# Patient Record
Sex: MALE | Race: OTHER | HISPANIC OR LATINO
[De-identification: names, ages, dates, MRNs, and addresses within clinical notes are randomized per-mention and may not be internally consistent; named-entity substitution may affect disease eponyms.]

---

## 2017-09-11 ENCOUNTER — TRANSCRIPTION ENCOUNTER (OUTPATIENT)
Age: 57
End: 2017-09-11

## 2018-05-07 ENCOUNTER — APPOINTMENT (OUTPATIENT)
Dept: DERMATOLOGY | Facility: CLINIC | Age: 58
End: 2018-05-07

## 2018-07-27 ENCOUNTER — APPOINTMENT (OUTPATIENT)
Dept: FAMILY MEDICINE | Facility: CLINIC | Age: 58
End: 2018-07-27
Payer: COMMERCIAL

## 2018-07-27 VITALS
SYSTOLIC BLOOD PRESSURE: 120 MMHG | WEIGHT: 168 LBS | HEART RATE: 81 BPM | HEIGHT: 67 IN | BODY MASS INDEX: 26.37 KG/M2 | TEMPERATURE: 98.5 F | OXYGEN SATURATION: 96 % | DIASTOLIC BLOOD PRESSURE: 78 MMHG

## 2018-07-27 DIAGNOSIS — R40.0 SOMNOLENCE: ICD-10-CM

## 2018-07-27 DIAGNOSIS — R53.83 OTHER MALAISE: ICD-10-CM

## 2018-07-27 DIAGNOSIS — Z87.39 PERSONAL HISTORY OF OTHER DISEASES OF THE MUSCULOSKELETAL SYSTEM AND CONNECTIVE TISSUE: ICD-10-CM

## 2018-07-27 DIAGNOSIS — Z91.81 HISTORY OF FALLING: ICD-10-CM

## 2018-07-27 DIAGNOSIS — M25.559 PAIN IN UNSPECIFIED HIP: ICD-10-CM

## 2018-07-27 DIAGNOSIS — Z87.19 PERSONAL HISTORY OF OTHER DISEASES OF THE DIGESTIVE SYSTEM: ICD-10-CM

## 2018-07-27 DIAGNOSIS — M54.16 RADICULOPATHY, LUMBAR REGION: ICD-10-CM

## 2018-07-27 DIAGNOSIS — Z00.00 ENCOUNTER FOR GENERAL ADULT MEDICAL EXAMINATION W/OUT ABNORMAL FINDINGS: ICD-10-CM

## 2018-07-27 DIAGNOSIS — R53.81 OTHER MALAISE: ICD-10-CM

## 2018-07-27 PROCEDURE — 99204 OFFICE O/P NEW MOD 45 MIN: CPT

## 2018-07-27 NOTE — REVIEW OF SYSTEMS
[Hearing Loss] : hearing loss [Hematuria] : hematuria [Frequency] : frequency [Negative] : Heme/Lymph [FreeTextEntry4] : tinnitus

## 2018-07-27 NOTE — ASSESSMENT
[FreeTextEntry1] : patient's blood pressure is slightly elevated, however there is no need for starting pharmacotherapy at this point. I have advised the patient to lose between 10 and 15 pounds and to make some lifestyle changes in order to maintain his blood pressure had a healthy level.\par The patient has been advised to return to the office for a complete physical exam in 6 weeks at which time he will receive a referral for gastroenterology for colonoscopy screening, patient states he had a colonoscopy about 10 years ago he was followed diverticulosis.\par The patient continues to followup with his urologist for BPH issues.

## 2018-07-27 NOTE — HEALTH RISK ASSESSMENT
[No falls in past year] : Patient reported no falls in the past year [0] : 2) Feeling down, depressed, or hopeless: Not at all (0) [] : No [de-identified] : 4 cigs / day x 40 yr intermittently [de-identified] : seldom alcohol drinker [[id number]] : 0

## 2018-07-27 NOTE — PHYSICAL EXAM
[No Acute Distress] : no acute distress [Well Nourished] : well nourished [Well Developed] : well developed [Well-Appearing] : well-appearing [No Respiratory Distress] : no respiratory distress  [Clear to Auscultation] : lungs were clear to auscultation bilaterally [No Accessory Muscle Use] : no accessory muscle use [Normal Rate] : normal rate  [Regular Rhythm] : with a regular rhythm [Normal S1, S2] : normal S1 and S2 [No Murmur] : no murmur heard [No Abdominal Bruit] : a ~M bruit was not heard ~T in the abdomen [No Edema] : there was no peripheral edema [Soft] : abdomen soft [Non Tender] : non-tender [Normal Bowel Sounds] : normal bowel sounds [Normal Gait] : normal gait [Coordination Grossly Intact] : coordination grossly intact

## 2018-07-27 NOTE — HISTORY OF PRESENT ILLNESS
[FreeTextEntry8] : this is a 58-year-old male with no significant past medical history presenting to the office to establish as a new patient in our practice and requested to have blood pressure checked. Patient has been seeing his urologist Dr. Jean Claude Noel secondary to BPH and prostatitis, states that on his last visit today he was told his blood pressure was elevated and that he should come in to see his PMD. The patient denies any chest pain, shortness of breath, dizziness, palpitations, blurred vision or headaches.

## 2018-08-01 ENCOUNTER — APPOINTMENT (OUTPATIENT)
Dept: PULMONOLOGY | Facility: CLINIC | Age: 58
End: 2018-08-01

## 2018-09-13 ENCOUNTER — APPOINTMENT (OUTPATIENT)
Dept: FAMILY MEDICINE | Facility: CLINIC | Age: 58
End: 2018-09-13

## 2018-11-15 ENCOUNTER — APPOINTMENT (OUTPATIENT)
Dept: FAMILY MEDICINE | Facility: CLINIC | Age: 58
End: 2018-11-15
Payer: COMMERCIAL

## 2018-11-15 ENCOUNTER — MED ADMIN CHARGE (OUTPATIENT)
Age: 58
End: 2018-11-15

## 2018-11-15 VITALS
DIASTOLIC BLOOD PRESSURE: 91 MMHG | HEART RATE: 67 BPM | TEMPERATURE: 98.1 F | BODY MASS INDEX: 26.68 KG/M2 | WEIGHT: 170 LBS | HEIGHT: 67 IN | OXYGEN SATURATION: 98 % | SYSTOLIC BLOOD PRESSURE: 136 MMHG

## 2018-11-15 DIAGNOSIS — Z76.89 PERSONS ENCOUNTERING HEALTH SERVICES IN OTHER SPECIFIED CIRCUMSTANCES: ICD-10-CM

## 2018-11-15 DIAGNOSIS — N52.9 MALE ERECTILE DYSFUNCTION, UNSPECIFIED: ICD-10-CM

## 2018-11-15 DIAGNOSIS — H93.11 TINNITUS, RIGHT EAR: ICD-10-CM

## 2018-11-15 PROCEDURE — 90686 IIV4 VACC NO PRSV 0.5 ML IM: CPT

## 2018-11-15 PROCEDURE — G0008: CPT

## 2018-11-15 PROCEDURE — 99213 OFFICE O/P EST LOW 20 MIN: CPT | Mod: 25

## 2018-11-15 RX ORDER — CIPROFLOXACIN HYDROCHLORIDE 500 MG/1
500 TABLET, FILM COATED ORAL
Qty: 6 | Refills: 0 | Status: COMPLETED | COMMUNITY
Start: 2018-07-27

## 2018-11-15 RX ORDER — MELOXICAM 15 MG/1
15 TABLET ORAL
Qty: 30 | Refills: 0 | Status: COMPLETED | COMMUNITY
Start: 2018-09-20

## 2018-11-15 NOTE — COUNSELING
[Weight management counseling provided] : Weight management [Healthy eating counseling provided] : healthy eating [Activity counseling provided] : activity [Smoking cessation counseling provided] : smoking cessation [___ min/wk activity recommended] : [unfilled] min/wk activity recommended [Quit Smoking] : Quit smoking

## 2018-11-15 NOTE — HISTORY OF PRESENT ILLNESS
[FreeTextEntry1] : " I am here for follow up" [de-identified] : Pt presents for blood pressure follow up, last visit July 2018, currently on no medication. Pt offers no acute complains, requests to have Rx for ED medication.

## 2018-11-15 NOTE — PHYSICAL EXAM
[No Acute Distress] : no acute distress [Well Nourished] : well nourished [Well Developed] : well developed [Well-Appearing] : well-appearing [No Respiratory Distress] : no respiratory distress  [Clear to Auscultation] : lungs were clear to auscultation bilaterally [No Accessory Muscle Use] : no accessory muscle use [Normal Rate] : normal rate  [Regular Rhythm] : with a regular rhythm [Normal S1, S2] : normal S1 and S2 [No Murmur] : no murmur heard [No Edema] : there was no peripheral edema

## 2018-11-15 NOTE — PLAN
[FreeTextEntry1] : \par \par Case discussed with and reviewed by supervising attending Dr. Danuta Chan M.D.

## 2018-11-15 NOTE — ASSESSMENT
[FreeTextEntry1] : This is a 58-year-old male with a past medical history of elevated blood pressure without diagnosis of HTN, BPH, direct dysfunction, presenting to the office for blood pressure check and requests prescription refills. \par \par Cardiovascular: History of elevated blood pressure.\par -The patient continues to be slightly elevated, diet and exercise again have been discussed with the patient.\par -There is no need for pharmacological intervention at this time, will continue to try to manage with lifestyle changes.\par \par : History of BPH, ED.\par -Patient being seen by urology Dr. Shanks.\par -Encouraged the patient to continue to take Flomax once a day.\par -The patient will receive a prescription for Cialis 10 mg which has been tolerated well in the past.\par \par Healthcare maintenance:\par -Patient will receive flu vaccine today.\par -Patient will get referral for gastroenterology for colonoscopy screening.\par -Patient has been encouraged to make an appointment for a complete physical exam at his earliest convenience.

## 2018-11-15 NOTE — HEALTH RISK ASSESSMENT
[No falls in past year] : Patient reported no falls in the past year [] : No [de-identified] : 4 cig / day on off x 40 yr [de-identified] : seldom alcohol drinker

## 2019-01-21 ENCOUNTER — APPOINTMENT (OUTPATIENT)
Dept: GASTROENTEROLOGY | Facility: CLINIC | Age: 59
End: 2019-01-21

## 2019-01-23 ENCOUNTER — TRANSCRIPTION ENCOUNTER (OUTPATIENT)
Age: 59
End: 2019-01-23

## 2019-02-15 ENCOUNTER — APPOINTMENT (OUTPATIENT)
Dept: FAMILY MEDICINE | Facility: CLINIC | Age: 59
End: 2019-02-15
Payer: COMMERCIAL

## 2019-02-15 VITALS
HEART RATE: 70 BPM | DIASTOLIC BLOOD PRESSURE: 94 MMHG | OXYGEN SATURATION: 98 % | TEMPERATURE: 98.2 F | BODY MASS INDEX: 26.68 KG/M2 | HEIGHT: 67 IN | WEIGHT: 170 LBS | SYSTOLIC BLOOD PRESSURE: 135 MMHG

## 2019-02-15 PROCEDURE — 99213 OFFICE O/P EST LOW 20 MIN: CPT

## 2019-02-15 NOTE — ASSESSMENT
[FreeTextEntry1] : This is a 58-year-old male with a past medical history of elevated blood pressure without diagnosis of HTN, BPH, direct dysfunction, presenting to the office for chest pain, knee pain.\par \par Cardiovascular: History of elevated blood pressure, episode of chest pain.\par -The patient continues to be slightly elevated, diet and exercise again have been discussed with the patient.\par -There is no need for pharmacological intervention at this time, will continue to try to manage with lifestyle changes.\par -EKG shows no acute changes, CP ? Dehydration / Pleurisy. Continue to monitor since now not smoking.\par \par : History of BPH, ED.\par -Patient being seen by urology Dr. Shanks.\par -Encouraged the patient to continue to take Flomax once a day.\par \par Healthcare maintenance:\par -Patient received flu vaccine last visit November 2018 today.\par -Patient will get referral for gastroenterology for colonoscopy screening again as he missed the appointment.\par -Patient has been encouraged to make an appointment for a complete physical exam at his earliest convenience.\par -Dermatology referral given\par -Xray of right knee.

## 2019-02-15 NOTE — REVIEW OF SYSTEMS
[Chest Pain] : chest pain [Joint Pain] : joint pain [Itching] : itching [Mole Changes] : mole changes [Skin Rash] : skin rash [Negative] : Respiratory [FreeTextEntry5] : See HPI [FreeTextEntry9] : See HPI

## 2019-02-15 NOTE — PHYSICAL EXAM
[No Acute Distress] : no acute distress [Well Nourished] : well nourished [Well Developed] : well developed [Well-Appearing] : well-appearing [No JVD] : no jugular venous distention [Supple] : supple [No Lymphadenopathy] : no lymphadenopathy [Thyroid Normal, No Nodules] : the thyroid was normal and there were no nodules present [No Respiratory Distress] : no respiratory distress  [Clear to Auscultation] : lungs were clear to auscultation bilaterally [No Accessory Muscle Use] : no accessory muscle use [Normal Rate] : normal rate  [Regular Rhythm] : with a regular rhythm [Normal S1, S2] : normal S1 and S2 [No Murmur] : no murmur heard [No Abdominal Bruit] : a ~M bruit was not heard ~T in the abdomen [No Edema] : there was no peripheral edema [Normal Gait] : normal gait [de-identified] : Right knee inspected, neg VALGUS/ VARUS maneuvers, + posterior right Knee soft / movable area, non tender.

## 2019-02-15 NOTE — HEALTH RISK ASSESSMENT
[] : No [No falls in past year] : Patient reported no falls in the past year [de-identified] : quit 8 weeks ago [de-identified] : occasional / social

## 2019-02-15 NOTE — HISTORY OF PRESENT ILLNESS
[FreeTextEntry8] : s/p episode of chest pain 8/10 with dizziness that lasted a few seconds while vacationing in [facility], episode did not repeat itself. Pt states that in the past he had similar episode, had Cardio work up many years ago which was negative. Pt states that since the episode of chest pain he has stopped smoking. Pt also complains of itchy mole in his back, had been sent to see dermatology last year but did not keep appointment. Pt also complains of pain in the RIGHT knee x 3 months, states that he cannot bend the knee completely. Pt has been getting therapy at his local [facility], has had about 10 sessions with only relief while undergoing therapy, then the pain returns. Pt has not been taking anything for pain, pain rated

## 2019-02-19 ENCOUNTER — FORM ENCOUNTER (OUTPATIENT)
Age: 59
End: 2019-02-19

## 2019-02-20 ENCOUNTER — OUTPATIENT (OUTPATIENT)
Dept: OUTPATIENT SERVICES | Facility: HOSPITAL | Age: 59
LOS: 1 days | End: 2019-02-20
Payer: COMMERCIAL

## 2019-02-20 ENCOUNTER — APPOINTMENT (OUTPATIENT)
Dept: RADIOLOGY | Facility: CLINIC | Age: 59
End: 2019-02-20

## 2019-02-20 DIAGNOSIS — M25.561 PAIN IN RIGHT KNEE: ICD-10-CM

## 2019-02-20 PROCEDURE — 73564 X-RAY EXAM KNEE 4 OR MORE: CPT

## 2019-02-20 PROCEDURE — 73564 X-RAY EXAM KNEE 4 OR MORE: CPT | Mod: 26,RT

## 2019-02-22 ENCOUNTER — APPOINTMENT (OUTPATIENT)
Dept: GASTROENTEROLOGY | Facility: CLINIC | Age: 59
End: 2019-02-22

## 2019-02-24 ENCOUNTER — RESULT REVIEW (OUTPATIENT)
Age: 59
End: 2019-02-24

## 2019-02-25 ENCOUNTER — APPOINTMENT (OUTPATIENT)
Dept: DERMATOLOGY | Facility: CLINIC | Age: 59
End: 2019-02-25
Payer: COMMERCIAL

## 2019-02-25 PROCEDURE — 99213 OFFICE O/P EST LOW 20 MIN: CPT | Mod: 25

## 2019-02-25 PROCEDURE — 11102 TANGNTL BX SKIN SINGLE LES: CPT

## 2020-03-02 ENCOUNTER — APPOINTMENT (OUTPATIENT)
Dept: FAMILY MEDICINE | Facility: CLINIC | Age: 60
End: 2020-03-02

## 2020-07-07 ENCOUNTER — TRANSCRIPTION ENCOUNTER (OUTPATIENT)
Age: 60
End: 2020-07-07

## 2020-10-20 ENCOUNTER — TRANSCRIPTION ENCOUNTER (OUTPATIENT)
Age: 60
End: 2020-10-20

## 2020-12-01 ENCOUNTER — APPOINTMENT (OUTPATIENT)
Dept: FAMILY MEDICINE | Facility: CLINIC | Age: 60
End: 2020-12-01
Payer: COMMERCIAL

## 2020-12-01 ENCOUNTER — OUTPATIENT (OUTPATIENT)
Dept: OUTPATIENT SERVICES | Facility: HOSPITAL | Age: 60
LOS: 1 days | End: 2020-12-01
Payer: COMMERCIAL

## 2020-12-01 ENCOUNTER — APPOINTMENT (OUTPATIENT)
Dept: RADIOLOGY | Facility: CLINIC | Age: 60
End: 2020-12-01
Payer: COMMERCIAL

## 2020-12-01 VITALS
RESPIRATION RATE: 14 BRPM | DIASTOLIC BLOOD PRESSURE: 84 MMHG | HEART RATE: 74 BPM | SYSTOLIC BLOOD PRESSURE: 122 MMHG | TEMPERATURE: 98.3 F | OXYGEN SATURATION: 98 % | BODY MASS INDEX: 27.15 KG/M2 | WEIGHT: 173 LBS | HEIGHT: 67 IN

## 2020-12-01 DIAGNOSIS — M25.551 PAIN IN RIGHT HIP: ICD-10-CM

## 2020-12-01 DIAGNOSIS — R07.89 OTHER CHEST PAIN: ICD-10-CM

## 2020-12-01 DIAGNOSIS — M25.561 PAIN IN RIGHT KNEE: ICD-10-CM

## 2020-12-01 DIAGNOSIS — R03.0 ELEVATED BLOOD-PRESSURE READING, W/OUT DIAGNOSIS OF HYPERTENSION: ICD-10-CM

## 2020-12-01 DIAGNOSIS — Z87.438 PERSONAL HISTORY OF OTHER DISEASES OF MALE GENITAL ORGANS: ICD-10-CM

## 2020-12-01 DIAGNOSIS — Z86.018 PERSONAL HISTORY OF OTHER BENIGN NEOPLASM: ICD-10-CM

## 2020-12-01 DIAGNOSIS — G89.29 PAIN IN RIGHT KNEE: ICD-10-CM

## 2020-12-01 PROCEDURE — 96372 THER/PROPH/DIAG INJ SC/IM: CPT

## 2020-12-01 PROCEDURE — 73521 X-RAY EXAM HIPS BI 2 VIEWS: CPT

## 2020-12-01 PROCEDURE — 99072 ADDL SUPL MATRL&STAF TM PHE: CPT

## 2020-12-01 PROCEDURE — 90686 IIV4 VACC NO PRSV 0.5 ML IM: CPT

## 2020-12-01 PROCEDURE — G0008: CPT

## 2020-12-01 PROCEDURE — 99213 OFFICE O/P EST LOW 20 MIN: CPT | Mod: 25

## 2020-12-01 PROCEDURE — 73521 X-RAY EXAM HIPS BI 2 VIEWS: CPT | Mod: 26

## 2020-12-01 RX ORDER — TADALAFIL 10 MG/1
10 TABLET, FILM COATED ORAL
Qty: 5 | Refills: 5 | Status: COMPLETED | COMMUNITY
Start: 2018-11-15 | End: 2020-12-01

## 2020-12-01 RX ORDER — TRIAMCINOLONE ACETONIDE 40 MG/ML
40 SUSPENSION INTRA-ARTERIAL; INTRAMUSCULAR
Qty: 1 | Refills: 0 | Status: COMPLETED | OUTPATIENT
Start: 2020-12-01

## 2020-12-01 RX ORDER — CYANOCOBALAMIN 1000 UG/ML
1000 INJECTION INTRAMUSCULAR; SUBCUTANEOUS
Qty: 0 | Refills: 0 | Status: COMPLETED | OUTPATIENT
Start: 2020-12-01

## 2020-12-01 RX ORDER — TAMSULOSIN HYDROCHLORIDE 0.4 MG/1
0.4 CAPSULE ORAL
Qty: 90 | Refills: 1 | Status: COMPLETED | COMMUNITY
Start: 2018-07-27 | End: 2020-12-01

## 2020-12-01 RX ADMIN — TRIAMCINOLONE ACETONIDE 1 MG/ML: 40 INJECTION, SUSPENSION INTRA-ARTICULAR; INTRAMUSCULAR at 00:00

## 2020-12-01 RX ADMIN — CYANOCOBALAMIN 0 MCG/ML: 1000 INJECTION INTRAMUSCULAR; SUBCUTANEOUS at 00:00

## 2020-12-01 NOTE — ASSESSMENT
[FreeTextEntry1] : This is a 58-year-old male with a past medical history of BPH, Erectyle dysfunction, presenting to the office c/o bilateral hip pain.\par \par MSK/RHEUM: Hip arthritis\par -Naproxen 500 mg bid prn\par -Cyanocobalamin 0.75 ml + Kenalog 40 mg/ml 1 mL injection\par -Bilateral Hip xray.\par \par : History of BPH, ED.\par -Patient has been off Flomax for a long time, no current complains\par \par Healthcare maintenance:\par -Patient received flu vaccine in house today.\par -Patient has been encouraged to make an appointment for a complete physical exam at his earliest convenience.\par -Last Colonoscopy 2013 with Dr Genao, repeat 2023\par

## 2020-12-01 NOTE — HISTORY OF PRESENT ILLNESS
[FreeTextEntry8] : This is a 59 y/o male with PMHx significant for BPH, Chronic knee pain, presenting c/o bilateral hip pain that is worse when going to sleep and early in the morning, improving as the day goes on, pt states that he has taken OTC NSAIDs with some relief. Pt denies any trauma, has family h/o early osteoporosis (mom in 30s).

## 2020-12-01 NOTE — COUNSELING
[AUDIT-C Screening administered and reviewed] : AUDIT-C Screening administered and reviewed [Encouraged to increase physical activity] : Encouraged to increase physical activity [____ min/wk Activity] : [unfilled] min/wk activity [None] : None [Good understanding] : Patient has a good understanding of lifestyle changes and steps needed to achieve self management goal

## 2020-12-01 NOTE — HEALTH RISK ASSESSMENT
[No falls in past year] : Patient reported no falls in the past year [Yes] : Yes [2 - 4 times a month (2 pts)] : 2-4 times a month (2 points) [1 or 2 (0 pts)] : 1 or 2 (0 points) [Never (0 pts)] : Never (0 points) [No] : In the past 12 months have you used drugs other than those required for medical reasons? No [0] : 2) Feeling down, depressed, or hopeless: Not at all (0) [] : No [de-identified] : quit 10 months ago [de-identified] : occasional / social [Audit-CScore] : 2 [de-identified] : none [de-identified] : regular [CFC2Ecmij] : 0

## 2021-04-26 ENCOUNTER — APPOINTMENT (OUTPATIENT)
Dept: FAMILY MEDICINE | Facility: CLINIC | Age: 61
End: 2021-04-26
Payer: COMMERCIAL

## 2021-04-26 VITALS
OXYGEN SATURATION: 98 % | TEMPERATURE: 98.4 F | SYSTOLIC BLOOD PRESSURE: 126 MMHG | RESPIRATION RATE: 16 BRPM | BODY MASS INDEX: 27.78 KG/M2 | DIASTOLIC BLOOD PRESSURE: 80 MMHG | HEART RATE: 61 BPM | WEIGHT: 177 LBS | HEIGHT: 67 IN

## 2021-04-26 DIAGNOSIS — Z72.0 TOBACCO USE: ICD-10-CM

## 2021-04-26 DIAGNOSIS — M25.551 PAIN IN RIGHT HIP: ICD-10-CM

## 2021-04-26 DIAGNOSIS — M25.552 PAIN IN RIGHT HIP: ICD-10-CM

## 2021-04-26 DIAGNOSIS — Z92.29 PERSONAL HISTORY OF OTHER DRUG THERAPY: ICD-10-CM

## 2021-04-26 PROCEDURE — 99072 ADDL SUPL MATRL&STAF TM PHE: CPT

## 2021-04-26 PROCEDURE — 99213 OFFICE O/P EST LOW 20 MIN: CPT

## 2021-04-28 PROBLEM — Z92.29 HISTORY OF INFLUENZA VACCINATION: Status: RESOLVED | Noted: 2018-11-15 | Resolved: 2021-04-28

## 2021-04-28 PROBLEM — M25.551 BILATERAL HIP PAIN: Status: ACTIVE | Noted: 2020-12-01

## 2021-04-28 NOTE — HEALTH RISK ASSESSMENT
[Yes] : Yes [2 - 4 times a month (2 pts)] : 2-4 times a month (2 points) [1 or 2 (0 pts)] : 1 or 2 (0 points) [Never (0 pts)] : Never (0 points) [No] : In the past 12 months have you used drugs other than those required for medical reasons? No [No falls in past year] : Patient reported no falls in the past year [0] : 2) Feeling down, depressed, or hopeless: Not at all (0) [] : No [de-identified] : quit 10 months ago [de-identified] : occasional / social [Audit-CScore] : 2 [de-identified] : none [de-identified] : regular [BQI2Uhknz] : 0

## 2021-04-28 NOTE — PHYSICAL EXAM
[Normal] : normal gait, coordination grossly intact, no focal deficits and deep tendon reflexes were 2+ and symmetric [de-identified] : No signs of trauma on bilateral hips, no erythema. RIGHT knee examined, slight inflammation but no edema, no sign of trauma, ballotable patella, + Valgus test, negative lachman, Negative posterior / anterior drawer test.

## 2021-04-28 NOTE — HISTORY OF PRESENT ILLNESS
[FreeTextEntry8] : This is a 59 y/o male with PMHx significant for BPH, Chronic knee pain, presenting c/o continued bilateral hip pain for which he was seen in Dec 2020, Xrays did not reveal any abnormality, he was given NSAIDs but states that the pain continues. Pt also c/o continued RIGHT knee pain which is chronic, no recent trauma, Xrays performed 2019 showed small effusion.

## 2021-04-28 NOTE — ASSESSMENT
[FreeTextEntry1] : This is a 58-year-old male with a past medical history of BPH, Erectile dysfunction, presenting to the office c/o still bilateral hip pain and chronic RIGHT knee pain.\par \par MSK/RHEUM: Hip arthritis, Knee pain\par -Naproxen 500 mg bid prn, Rx refilled\par -Medrol dose pack\par -right knee MRI (needs P.A.)\par -Bilateral Hip xray performed, unremarkable\par -RIGHT knee Xray 2019, small joint effusion\par -Possible Ortho eval.\par \par : History of BPH, ED.\par -Patient has been off Flomax for a long time, no current complains\par \par PULM: Smoking trying to quit\par -Start Chantx starter pack.\par -Side effects discussed.\par \par Healthcare maintenance:\par -Patient received flu vaccine in house 12/20\par -Patient has been encouraged to make an appointment for a complete physical exam at his earliest convenience.\par -Last Colonoscopy 2013 with Dr Genao, repeat 2023\par -Recommend Covid vaccine\par

## 2021-05-17 ENCOUNTER — APPOINTMENT (OUTPATIENT)
Dept: FAMILY MEDICINE | Facility: CLINIC | Age: 61
End: 2021-05-17
Payer: COMMERCIAL

## 2021-05-17 VITALS
OXYGEN SATURATION: 98 % | HEIGHT: 67 IN | SYSTOLIC BLOOD PRESSURE: 120 MMHG | WEIGHT: 170 LBS | RESPIRATION RATE: 12 BRPM | DIASTOLIC BLOOD PRESSURE: 70 MMHG | BODY MASS INDEX: 26.68 KG/M2 | TEMPERATURE: 97.9 F | HEART RATE: 71 BPM

## 2021-05-17 DIAGNOSIS — Z11.59 ENCOUNTER FOR SCREENING FOR OTHER VIRAL DISEASES: ICD-10-CM

## 2021-05-17 DIAGNOSIS — Z11.4 ENCOUNTER FOR SCREENING FOR HUMAN IMMUNODEFICIENCY VIRUS [HIV]: ICD-10-CM

## 2021-05-17 PROCEDURE — 36415 COLL VENOUS BLD VENIPUNCTURE: CPT

## 2021-05-17 PROCEDURE — 99072 ADDL SUPL MATRL&STAF TM PHE: CPT

## 2021-05-17 PROCEDURE — 99396 PREV VISIT EST AGE 40-64: CPT | Mod: 25

## 2021-05-17 RX ORDER — METHYLPREDNISOLONE 4 MG/1
4 TABLET ORAL
Qty: 21 | Refills: 0 | Status: COMPLETED | COMMUNITY
Start: 2021-04-26

## 2021-05-17 NOTE — HISTORY OF PRESENT ILLNESS
[FreeTextEntry8] : This is a 59 y/o male with PMHx significant for BPH, Chronic knee pain, presenting c/o continued bilateral hip pain for which he was seen in Dec 2020, Xrays did not reveal any abnormality, he was given NSAIDs but states that the pain continues. Pt also c/o continued RIGHT knee pain which is chronic, no recent trauma, Xrays performed 2019 showed small effusion. [FreeTextEntry1] : Physical exam [de-identified] : This is a 59 y/o male with PMHx significant for BPH, Chronic knee pain, presenting c/o continued bilateral hip pain for which he was seen in Dec 2020, X-rays did not reveal any abnormality, he was given NSAIDs but states that the pain continues. Pt also c/o continued RIGHT knee pain which is chronic, no recent trauma, X-rays performed 2019 showed small effusion.

## 2021-05-17 NOTE — ASSESSMENT
[FreeTextEntry1] : This is a 58-year-old male with a past medical history of BPH, Erectile dysfunction, presenting to the office for CPE, still c/o bilateral hip pain and chronic RIGHT knee pain which improved after Medrol pack.\par \par MSK/RHEUM: Hip arthritis, Knee pain\par -Naproxen 500 mg bid prn, Rx refilled\par -right knee MRI (needs P.A.)\par -Bilateral Hip xray performed, unremarkable\par -RIGHT knee Xray 2019, small joint effusion\par -Possible Ortho eval.\par \par : History of BPH, ED.\par -Patient has been off Flomax for a long time, no current complains\par \par Healthcare maintenance:\par -Fasting blood work in house today.\par -Patient received flu vaccine in house 12/20\par -Patient has been encouraged to make an appointment for a complete physical exam at his earliest convenience.\par -Last Colonoscopy 2013 with Dr Genao, repeat 2023\par -PFIZER Covid vaccine dose #1 5/5/21\par

## 2021-05-17 NOTE — HEALTH RISK ASSESSMENT
[Yes] : Yes [2 - 4 times a month (2 pts)] : 2-4 times a month (2 points) [1 or 2 (0 pts)] : 1 or 2 (0 points) [Never (0 pts)] : Never (0 points) [No] : In the past 12 months have you used drugs other than those required for medical reasons? No [No falls in past year] : Patient reported no falls in the past year [0] : 2) Feeling down, depressed, or hopeless: Not at all (0) [Good] : ~his/her~  mood as  good [Patient reported colonoscopy was normal] : Patient reported colonoscopy was normal [HIV Test offered] : HIV Test offered [Hepatitis C test offered] : Hepatitis C test offered [None] : None [With Family] : lives with family [# of Members in Household ___] :  household currently consist of [unfilled] member(s) [Employed] : employed [] :  [# Of Children ___] : has [unfilled] children [Sexually Active] : sexually active [Feels Safe at Home] : Feels safe at home [Fully functional (bathing, dressing, toileting, transferring, walking, feeding)] : Fully functional (bathing, dressing, toileting, transferring, walking, feeding) [Fully functional (using the telephone, shopping, preparing meals, housekeeping, doing laundry, using] : Fully functional and needs no help or supervision to perform IADLs (using the telephone, shopping, preparing meals, housekeeping, doing laundry, using transportation, managing medications and managing finances) [Smoke Detector] : smoke detector [Carbon Monoxide Detector] : carbon monoxide detector [Seat Belt] :  uses seat belt [With Patient/Caregiver] : With Patient/Caregiver [] : No [de-identified] : quit 11 months ago [de-identified] : occasional / social [Audit-CScore] : 2 [de-identified] : none [de-identified] : regular [[id number]] : 0 [Change in mental status noted] : No change in mental status noted [Language] : denies difficulty with language [Behavior] : denies difficulty with behavior [Learning/Retaining New Information] : denies difficulty learning/retaining new information [Handling Complex Tasks] : denies difficulty handling complex tasks [Reasoning] : denies difficulty with reasoning [Spatial Ability and Orientation] : denies difficulty with spatial ability and orientation [Reports changes in hearing] : Reports no changes in hearing [Reports changes in vision] : Reports no changes in vision [Reports changes in dental health] : Reports no changes in dental health [Guns at Home] : no guns at home [ColonoscopyDate] : 2013 [ColonoscopyComments] : Repeat 2023 [de-identified] : 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

## 2021-05-17 NOTE — COUNSELING
[AUDIT-C Screening administered and reviewed] : AUDIT-C Screening administered and reviewed [Encouraged to increase physical activity] : Encouraged to increase physical activity [____ min/wk Activity] : [unfilled] min/wk activity [None] : None [Good understanding] : Patient has a good understanding of lifestyle changes and steps needed to achieve self management goal [Fall prevention counseling provided] : Fall prevention counseling provided [Adequate lighting] : Adequate lighting [No throw rugs] : No throw rugs [Use proper foot wear] : Use proper foot wear [Use recommended devices] : Use recommended devices [Sleep ___ hours/day] : Sleep [unfilled] hours/day [Engage in a relaxing activity] : Engage in a relaxing activity

## 2021-05-27 LAB
25(OH)D3 SERPL-MCNC: 27.5 NG/ML
APPEARANCE: CLEAR
BACTERIA: NEGATIVE
BILIRUBIN URINE: NEGATIVE
BLOOD URINE: NEGATIVE
COLOR: YELLOW
CRP SERPL-MCNC: <3 MG/L
ERYTHROCYTE [SEDIMENTATION RATE] IN BLOOD BY WESTERGREN METHOD: 3 MM/HR
GLUCOSE QUALITATIVE U: NEGATIVE
HCV AB SER QL: NONREACTIVE
HCV S/CO RATIO: 0.58 S/CO
HIV1+2 AB SPEC QL IA.RAPID: NONREACTIVE
HYALINE CASTS: 0 /LPF
KETONES URINE: NEGATIVE
LEUKOCYTE ESTERASE URINE: NEGATIVE
MICROSCOPIC-UA: NORMAL
NITRITE URINE: NEGATIVE
PH URINE: 5.5
PROTEIN URINE: NEGATIVE
RED BLOOD CELLS URINE: 1 /HPF
SPECIFIC GRAVITY URINE: 1.03
SQUAMOUS EPITHELIAL CELLS: 0 /HPF
UROBILINOGEN URINE: NORMAL
WHITE BLOOD CELLS URINE: 0 /HPF

## 2021-05-27 RX ORDER — SIMVASTATIN 20 MG/1
20 TABLET, FILM COATED ORAL DAILY
Qty: 90 | Refills: 1 | Status: ACTIVE | COMMUNITY
Start: 2021-05-27 | End: 1900-01-01

## 2021-06-01 ENCOUNTER — APPOINTMENT (OUTPATIENT)
Dept: RADIOLOGY | Facility: CLINIC | Age: 61
End: 2021-06-01
Payer: COMMERCIAL

## 2021-06-01 ENCOUNTER — OUTPATIENT (OUTPATIENT)
Dept: OUTPATIENT SERVICES | Facility: HOSPITAL | Age: 61
LOS: 1 days | End: 2021-06-01
Payer: COMMERCIAL

## 2021-06-01 DIAGNOSIS — Z00.8 ENCOUNTER FOR OTHER GENERAL EXAMINATION: ICD-10-CM

## 2021-06-01 PROCEDURE — 77085 DXA BONE DENSITY AXL VRT FX: CPT | Mod: 26

## 2021-06-01 PROCEDURE — 77085 DXA BONE DENSITY AXL VRT FX: CPT

## 2021-06-10 LAB
ALBUMIN SERPL ELPH-MCNC: 4.2 G/DL
ALP BLD-CCNC: 96 U/L
ALT SERPL-CCNC: 41 U/L
ANION GAP SERPL CALC-SCNC: 15 MMOL/L
AST SERPL-CCNC: 25 U/L
BASOPHILS # BLD AUTO: 0.05 K/UL
BASOPHILS NFR BLD AUTO: 0.7 %
BILIRUB SERPL-MCNC: 0.9 MG/DL
BUN SERPL-MCNC: 19 MG/DL
CALCIUM SERPL-MCNC: 9.5 MG/DL
CHLORIDE SERPL-SCNC: 103 MMOL/L
CHOLEST SERPL-MCNC: 222 MG/DL
CO2 SERPL-SCNC: 25 MMOL/L
CREAT SERPL-MCNC: 1.01 MG/DL
EOSINOPHIL # BLD AUTO: 0.23 K/UL
EOSINOPHIL NFR BLD AUTO: 3.2 %
GLUCOSE SERPL-MCNC: 105 MG/DL
HCT VFR BLD CALC: 46.8 %
HDLC SERPL-MCNC: 53 MG/DL
HGB BLD-MCNC: 15.1 G/DL
IMM GRANULOCYTES NFR BLD AUTO: 0.1 %
LDLC SERPL CALC-MCNC: 122 MG/DL
LYMPHOCYTES # BLD AUTO: 2.79 K/UL
LYMPHOCYTES NFR BLD AUTO: 39 %
MAN DIFF?: NORMAL
MCHC RBC-ENTMCNC: 30.8 PG
MCHC RBC-ENTMCNC: 32.3 GM/DL
MCV RBC AUTO: 95.3 FL
MONOCYTES # BLD AUTO: 0.55 K/UL
MONOCYTES NFR BLD AUTO: 7.7 %
NEUTROPHILS # BLD AUTO: 3.52 K/UL
NEUTROPHILS NFR BLD AUTO: 49.3 %
NONHDLC SERPL-MCNC: 169 MG/DL
PLATELET # BLD AUTO: 273 K/UL
POTASSIUM SERPL-SCNC: 4.3 MMOL/L
PROT SERPL-MCNC: 6.8 G/DL
PSA SERPL-MCNC: 1.89 NG/ML
RBC # BLD: 4.91 M/UL
RBC # FLD: 13.3 %
SODIUM SERPL-SCNC: 143 MMOL/L
TRIGL SERPL-MCNC: 235 MG/DL
TSH SERPL-ACNC: 1.43 UIU/ML
WBC # FLD AUTO: 7.15 K/UL

## 2022-06-16 ENCOUNTER — APPOINTMENT (OUTPATIENT)
Dept: ORTHOPEDIC SURGERY | Facility: CLINIC | Age: 62
End: 2022-06-16
Payer: COMMERCIAL

## 2022-06-16 VITALS
SYSTOLIC BLOOD PRESSURE: 144 MMHG | HEIGHT: 67 IN | BODY MASS INDEX: 26.68 KG/M2 | DIASTOLIC BLOOD PRESSURE: 94 MMHG | WEIGHT: 170 LBS | HEART RATE: 63 BPM

## 2022-06-16 DIAGNOSIS — M17.11 UNILATERAL PRIMARY OSTEOARTHRITIS, RIGHT KNEE: ICD-10-CM

## 2022-06-16 PROCEDURE — 99203 OFFICE O/P NEW LOW 30 MIN: CPT

## 2022-06-16 PROCEDURE — 73564 X-RAY EXAM KNEE 4 OR MORE: CPT | Mod: RT

## 2022-06-16 RX ORDER — MELOXICAM 15 MG/1
15 TABLET ORAL
Qty: 30 | Refills: 0 | Status: ACTIVE | COMMUNITY
Start: 2022-06-16 | End: 1900-01-01

## 2022-06-16 NOTE — DISCUSSION/SUMMARY
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  He has not had this mass evaluated and there is concerned that it could be a low-grade tumor.  I have therefore given him an urgent referral to Dr. Joaquin Luna for further evaluation management of this mass.  As for his arthritis I would recommend conservative treatment this time.  I given prescription for meloxicam 15 mg daily as needed for pain.  Have also recommended physical therapy.  I do think that he may be a candidate for viscosupplementation in the future however he needs to have this mass worked up prior to any type of intervention.  Patient is in agreement this and understands.  We did review his MRI together in the office today.  I will see him back in 2 months for repeat evaluation.  All questions were asked and answered.  He was advised may be a candidate for total knee in the future however based on his imaging I do not think he is a candidate at this time

## 2022-06-16 NOTE — PHYSICAL EXAM
[de-identified] : GENERAL APPEARANCE: Well nourished and hydrated, pleasant, alert, and oriented x 3. Appears their stated age. \par HEENT: Normocephalic, extraocular eye motion intact. Nasal septum midline. Oral cavity clear. External auditory canal clear. \par RESPIRATORY: Breath sounds clear and audible in all lobes. No wheezing, No accessory muscle use.\par CARDIOVASCULAR: No apparent abnormalities. No lower leg edema. No varicosities. Pedal pulses are palpable.\par NEUROLOGIC: Sensation is normal, no muscle weakness in the upper or lower extremities.\par DERMATOLOGIC: No apparent skin lesions, moist, warm, no rash.\par SPINE: Cervical spine appears normal and moves freely; thoracic spine appears normal and moves freely; lumbosacral spine appears normal and moves freely, normal, nontender.\par MUSCULOSKELETAL: Hands, wrists, and elbows are normal and move freely, shoulders are normal and move freely. \par Psychiatric: Oriented to person, place, and time, insight and judgement were intact and the affect was normal. \par Musculoskeletal:. Left knee exam shows no effusion, ROM is 0-1 30 degrees, no instability, no pain with Emerson, no joint line tenderness. \par Right knee exam shows mild effusion, ROM is 0-1 30 degrees, no instability, no pain with Emerson, medial joint line tenderness. \par 5/5 motor strength in bilateral lower extremities. Sensory: Intact in bilateral lower extremities. DTRs: Biceps, brachioradialis, triceps, patellar, ankle and plantar 2+ and symmetric bilaterally. Pulses: dorsalis pedis, posterior tibial, femoral, popliteal, and radial 2+ and symmetric bilaterally. \par \par  [de-identified] : 4 views of bilateral knees obtained the office today show no acute fracture or dislocation.  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  No significant degenerative changes noted in the left knee\par \par MRI of the right knee dated 6/7/2022 shows fusiform soft tissue mass in the prepatellar fat pad with heterogeneous enhancement after contrast.  Gradient images were not performed.  Patient will need to be recalled for gradient imaging.  This could represent PVNS versus a more aggressive lesion.  Tricompartmental osteoarthritis most pronounced the medial compartment.  Subchondral cystic change and bone marrow edema.  No meniscal tear or ligament injury.  Moderate joint synovitis.  Unchanged findings since previous exam.

## 2022-06-16 NOTE — HISTORY OF PRESENT ILLNESS
[de-identified] : Patient is a 62-year-old male presenting with a chief complaint of right knee pain as well as some mild left knee discomfort that has been going on for many years.  Patient states that his previously received cortisone injections in the right knee that have failed to provide much relief of the pain.  States he is able to go up stairs however when he is going downstairs it does cause discomfort in his knee.  States it mainly over the medial aspect of the knee.  Has not been in formal physical therapy yet.  Has not been taking any medication for this.  Does have an MRI that shows a mass in his knee.  Is here today for evaluation of his arthritis as well as this mass.  Denies any fevers or chills.  Does complain of night pain in the knee

## 2022-06-22 ENCOUNTER — APPOINTMENT (OUTPATIENT)
Dept: ORTHOPEDIC SURGERY | Facility: CLINIC | Age: 62
End: 2022-06-22
Payer: COMMERCIAL

## 2022-06-22 VITALS
SYSTOLIC BLOOD PRESSURE: 133 MMHG | HEART RATE: 61 BPM | WEIGHT: 170 LBS | DIASTOLIC BLOOD PRESSURE: 67 MMHG | BODY MASS INDEX: 26.68 KG/M2 | HEIGHT: 67 IN | OXYGEN SATURATION: 98 %

## 2022-06-22 PROCEDURE — 99214 OFFICE O/P EST MOD 30 MIN: CPT

## 2022-06-22 NOTE — PHYSICAL EXAM
[FreeTextEntry1] : On exam the patient stands in good balance.  He is able to move around with range of motion from 0 to 125 degrees.  No obvious instability and no effusion at this point.  He has no palpable mass deep to the patella where the lesion is.  He has no palpable tenderness posteriorly along the proximal tibia along the lateral joint line.  He has no lymphadenopathy and is neurovascularly intact. [General Appearance - Well-Appearing] : Well appearing [General Appearance - Well Nourished] : well nourished [Oriented To Time, Place, And Person] : Oriented to person, place, and time [Impaired Insight] : Insight and judgment were intact [Affect] : The affect was normal. [Mood] : the mood was normal [Sclera] : the sclera and conjunctiva were normal [Neck Cervical Mass (___cm)] : no neck mass was observed [Heart Rate And Rhythm] : heart rate was normal and rhythm regular [] : No respiratory distress [Bowel Sounds] : normal bowel sounds [Normal Station and Gait] : gait and station were normal [Tenderness] : tenderness [Swelling] : no swelling [Masses] : no masses [Skin Changes - Describe changes:] : No skin changes noted [Full ROM Unless otherwise noted:] : Full range of motion unless otherwise noted: [LE  Motor Strength Normal unless otherwise noted:] : 5/5 strength in bilateral lower extemities unless otherwise noted. [Normal] : Sensation intact to light touch. [2+] : left 2+

## 2022-06-22 NOTE — REVIEW OF SYSTEMS
[Joint Pain] : joint pain [Joint Stiffness] : joint stiffness [Joint Swelling] : joint swelling [Nl] : Hematologic/Lymphatic

## 2022-06-22 NOTE — DATA REVIEWED
[Imaging Present] : Present [de-identified] : X-rays to June 16 of the right knee show no obvious bony abnormalities.  There may be a small questionable cyst in the back of the proximal tibia.  \par \par Compared to x-rays from 2019 there is no change.\par \par MRI from March 18, 2022 as well as June 2022 with repeat including contrast shows a 2 x 2 centimeter fusiform mass in the preforaminal fat pad just superior to the patella.  There is no significant edema or bone reaction to this.  There is also small popliteal cyst as well as a cystic change at the posterior lateral tibial plateau and patellofemoral cartilage loss.

## 2022-06-22 NOTE — DISCUSSION/SUMMARY
[Surgical risks reviewed] : Surgical risks reviewed [All Questions Answered] : Patient (and family) had all questions answered to an agreeable level of satisfaction [Interested in Proceeding] : Patient (and family) expressed understanding and interest in proceeding with the plan as outlined [de-identified] : Patient has a lesion which could be synovial based versus other etiology.  There is a suggestion on MRI of synovial sarcoma however this is less likely.  This could be synovial hemangioma versus nodular synovitis or other lesion.  Regardless I think it is appropriate for tissue sampling.  I would taken to the operating room do a small open to get a tissue and then based on this pathology take out the entire lesion.  He understands that he may need further surgery after this however is appropriate based on these findings as well as his symptoms.  He understands there is a chance of stiffness malignancy continued pain and need for further surgery.\par \par If imaging was ordered, the patient was told to make an appointment to review findings right after all imaging is completed.\par \par We discussed risks, benefits and alternatives. Rationale of care was reviewed and all questions were answered. Patient (and family) had all questions answered to her degree of the level of satisfaction. Patient (and family) expressed understanding and interest in proceeding with the plan as outlined.\par \par \par \par \par This note was done with a voice recognition transcription software and any typos are related to this rather than medical error. Surgical risks reviewed. Patient (and family) had all questions answered to an agreeable level of satisfaction. Patient (and family) expressed understanding and interest in proceeding with the plan as outlined.  \par

## 2022-06-22 NOTE — HISTORY OF PRESENT ILLNESS
[FreeTextEntry1] : Is a 62-year-old gentleman who comes in with on-and-off right knee pain.  He has been having this for 40 years.  He has also had swelling on and off for some time.  He just recently had an MRI scan for the first time.  He has had x-rays back in 2019 but did not show any problems.  He has had on and off swelling.  He has had injections with minimal relief of an unknown type.  He was also had some massage which has helped the swelling go down and his pain got better. [Improving] : improving [___ mths] : [unfilled] month(s) ago [7] : currently ~his/her~ pain is 7 out of 10

## 2022-06-27 ENCOUNTER — OUTPATIENT (OUTPATIENT)
Dept: OUTPATIENT SERVICES | Facility: HOSPITAL | Age: 62
LOS: 1 days | End: 2022-06-27

## 2022-06-27 VITALS
HEIGHT: 65.5 IN | RESPIRATION RATE: 16 BRPM | TEMPERATURE: 98 F | HEART RATE: 61 BPM | WEIGHT: 160.06 LBS | DIASTOLIC BLOOD PRESSURE: 92 MMHG | OXYGEN SATURATION: 100 % | SYSTOLIC BLOOD PRESSURE: 150 MMHG

## 2022-06-27 DIAGNOSIS — R22.41 LOCALIZED SWELLING, MASS AND LUMP, RIGHT LOWER LIMB: ICD-10-CM

## 2022-06-27 DIAGNOSIS — R22.40 LOCALIZED SWELLING, MASS AND LUMP, UNSPECIFIED LOWER LIMB: ICD-10-CM

## 2022-06-27 DIAGNOSIS — R03.0 ELEVATED BLOOD-PRESSURE READING, WITHOUT DIAGNOSIS OF HYPERTENSION: ICD-10-CM

## 2022-06-27 DIAGNOSIS — Z98.890 OTHER SPECIFIED POSTPROCEDURAL STATES: Chronic | ICD-10-CM

## 2022-06-27 DIAGNOSIS — Z90.49 ACQUIRED ABSENCE OF OTHER SPECIFIED PARTS OF DIGESTIVE TRACT: Chronic | ICD-10-CM

## 2022-06-27 DIAGNOSIS — Z90.89 ACQUIRED ABSENCE OF OTHER ORGANS: Chronic | ICD-10-CM

## 2022-06-27 LAB
ALBUMIN SERPL ELPH-MCNC: 4.6 G/DL — SIGNIFICANT CHANGE UP (ref 3.3–5)
ALP SERPL-CCNC: 83 U/L — SIGNIFICANT CHANGE UP (ref 40–120)
ALT FLD-CCNC: 28 U/L — SIGNIFICANT CHANGE UP (ref 4–41)
ANION GAP SERPL CALC-SCNC: 10 MMOL/L — SIGNIFICANT CHANGE UP (ref 7–14)
AST SERPL-CCNC: 19 U/L — SIGNIFICANT CHANGE UP (ref 4–40)
BILIRUB SERPL-MCNC: 0.7 MG/DL — SIGNIFICANT CHANGE UP (ref 0.2–1.2)
BLD GP AB SCN SERPL QL: NEGATIVE — SIGNIFICANT CHANGE UP
BUN SERPL-MCNC: 12 MG/DL — SIGNIFICANT CHANGE UP (ref 7–23)
CALCIUM SERPL-MCNC: 9.3 MG/DL — SIGNIFICANT CHANGE UP (ref 8.4–10.5)
CHLORIDE SERPL-SCNC: 102 MMOL/L — SIGNIFICANT CHANGE UP (ref 98–107)
CO2 SERPL-SCNC: 27 MMOL/L — SIGNIFICANT CHANGE UP (ref 22–31)
CREAT SERPL-MCNC: 0.86 MG/DL — SIGNIFICANT CHANGE UP (ref 0.5–1.3)
EGFR: 99 ML/MIN/1.73M2 — SIGNIFICANT CHANGE UP
GLUCOSE SERPL-MCNC: 103 MG/DL — HIGH (ref 70–99)
HCT VFR BLD CALC: 47.1 % — SIGNIFICANT CHANGE UP (ref 39–50)
HGB BLD-MCNC: 14.9 G/DL — SIGNIFICANT CHANGE UP (ref 13–17)
MCHC RBC-ENTMCNC: 29.6 PG — SIGNIFICANT CHANGE UP (ref 27–34)
MCHC RBC-ENTMCNC: 31.6 GM/DL — LOW (ref 32–36)
MCV RBC AUTO: 93.5 FL — SIGNIFICANT CHANGE UP (ref 80–100)
NRBC # BLD: 0 /100 WBCS — SIGNIFICANT CHANGE UP
NRBC # FLD: 0 K/UL — SIGNIFICANT CHANGE UP
PLATELET # BLD AUTO: 304 K/UL — SIGNIFICANT CHANGE UP (ref 150–400)
POTASSIUM SERPL-MCNC: 4.5 MMOL/L — SIGNIFICANT CHANGE UP (ref 3.5–5.3)
POTASSIUM SERPL-SCNC: 4.5 MMOL/L — SIGNIFICANT CHANGE UP (ref 3.5–5.3)
PROT SERPL-MCNC: 7.9 G/DL — SIGNIFICANT CHANGE UP (ref 6–8.3)
RBC # BLD: 5.04 M/UL — SIGNIFICANT CHANGE UP (ref 4.2–5.8)
RBC # FLD: 13.2 % — SIGNIFICANT CHANGE UP (ref 10.3–14.5)
RH IG SCN BLD-IMP: POSITIVE — SIGNIFICANT CHANGE UP
SODIUM SERPL-SCNC: 139 MMOL/L — SIGNIFICANT CHANGE UP (ref 135–145)
WBC # BLD: 7.7 K/UL — SIGNIFICANT CHANGE UP (ref 3.8–10.5)
WBC # FLD AUTO: 7.7 K/UL — SIGNIFICANT CHANGE UP (ref 3.8–10.5)

## 2022-06-27 PROCEDURE — 93010 ELECTROCARDIOGRAM REPORT: CPT

## 2022-06-27 NOTE — H&P PST ADULT - PROBLEM SELECTOR PLAN 1
Biopsy resection of right knee mass.     CBC CMP Hgba1C T&S EKG    Preop instructions and antibacterial soap given and explained (verbal and written), with teach back.

## 2022-06-27 NOTE — H&P PST ADULT - ATTENDING COMMENTS
I have reviewed and agree with note as written above  for resection right knee mass  Risks, benefits and alternatives discussed with patient.  Joaquin Luna MD  Musculoskeletal Oncology  [phone]

## 2022-06-27 NOTE — H&P PST ADULT - NSANTHOSAYNRD_GEN_A_CORE
No. SANTO screening performed.  STOP BANG Legend: 0-2 = LOW Risk; 3-4 = INTERMEDIATE Risk; 5-8 = HIGH Risk

## 2022-06-27 NOTE — H&P PST ADULT - NSICDXPASTMEDICALHX_GEN_ALL_CORE_FT
PAST MEDICAL HISTORY:  Borderline hypertension     H/O gastroesophageal reflux (GERD)     Mass of right knee      PAST MEDICAL HISTORY:  Borderline hypertension     H/O gastroesophageal reflux (GERD)     Mass of right knee     Prediabetes

## 2022-06-27 NOTE — H&P PST ADULT - HISTORY OF PRESENT ILLNESS
63 y/o male with hx of pain right knee x4 years, worsening. Right knee mass discovered on imaging.  Scheduled for Biopsy resection of right knee mass.  63 y/o male with hx of pain right knee x4 years, worsening. Right knee mass discovered on imaging.  Scheduled for Biopsy resection of right knee mass.

## 2022-06-28 PROBLEM — R22.41 LOCALIZED SWELLING, MASS AND LUMP, RIGHT LOWER LIMB: Chronic | Status: ACTIVE | Noted: 2022-06-27

## 2022-06-28 PROBLEM — R03.0 ELEVATED BLOOD-PRESSURE READING, WITHOUT DIAGNOSIS OF HYPERTENSION: Chronic | Status: ACTIVE | Noted: 2022-06-27

## 2022-06-28 PROBLEM — R73.03 PREDIABETES: Chronic | Status: ACTIVE | Noted: 2022-06-27

## 2022-06-28 PROBLEM — Z87.19 PERSONAL HISTORY OF OTHER DISEASES OF THE DIGESTIVE SYSTEM: Chronic | Status: ACTIVE | Noted: 2022-06-27

## 2022-07-05 ENCOUNTER — APPOINTMENT (OUTPATIENT)
Dept: FAMILY MEDICINE | Facility: CLINIC | Age: 62
End: 2022-07-05

## 2022-07-13 ENCOUNTER — TRANSCRIPTION ENCOUNTER (OUTPATIENT)
Age: 62
End: 2022-07-13

## 2022-07-13 NOTE — ASU PATIENT PROFILE, ADULT - NSICDXPASTSURGICALHX_GEN_ALL_CORE_FT
PAST SURGICAL HISTORY:  H/O circumcision     History of cholecystectomy 2007    History of tonsillectomy

## 2022-07-13 NOTE — ASU PATIENT PROFILE, ADULT - NSICDXPASTMEDICALHX_GEN_ALL_CORE_FT
PAST MEDICAL HISTORY:  Borderline hypertension     H/O gastroesophageal reflux (GERD)     Mass of right knee     Prediabetes

## 2022-07-13 NOTE — ASU PATIENT PROFILE, ADULT - FALL HARM RISK - UNIVERSAL INTERVENTIONS
Bed in lowest position, wheels locked, appropriate side rails in place/Call bell, personal items and telephone in reach/Instruct patient to call for assistance before getting out of bed or chair/Non-slip footwear when patient is out of bed/[place] to call system/Physically safe environment - no spills, clutter or unnecessary equipment/Purposeful Proactive Rounding/Room/bathroom lighting operational, light cord in reach

## 2022-07-13 NOTE — ASU PATIENT PROFILE, ADULT - HOW PATIENT ADDRESSED, PROFILE
Post Acute Skilled Nursing Home Subsequent Visit Note     Date of Service: 12/22/2021  Location seen at: [facility] [place] [facility]  Subacute / Skilled Need: Rehabilitation    PCP: Moraima BROWN MD   Patient Care Team:  Moraima BROWN MD as PCP - General  Raisa Pizarro as RN Care Coordinator (Registered Nurse)  Nadege Valdez RN as Care Transitions RN (Registered Nurse)  Carmelina Appiah MD as Post Acute Facility Provider: Physician (Geriatric Medicine)  RAMON Celestin as Post Acute Facility Provider: APC (Nurse Practitioner - Family)  Seen by RAMON Celestin today    Connie Cid is a 89 year old female presenting to Post Acute Skilled Nursing for: right rib fractures.   History of Present Illness:   Patient is seen today for a subsequent visit.  Patient tested positive for Covid-19 on 12/16. Patient does have a loose, productive cough this morning.  She denies SOB, wheezing, fever or chills.  Her vitals have been stable.   CXR was obtained on 12/21, displayed re-visualization of previously observed pleural effusions.  Imaging was negative for apparent viral or bacterial pneumonia.  Findings were discussed with Janette WING of Dr. Wolf's team, and a referral to pulmonology was placed.  Discussed findings with patient and her daughter, as well.   Patient's weight of 127 lbs does display a 7 lb increase since admission.  Plan is to change diuretic regimen from Lasix 40 mg BID to Bumex 1 mg TID for three days, then Bumex 1 mg BID with regular BMP monitoring and daily weights.    Labs on 12/21 displayed stable creatinine and d-dimer levels.  Pro-BNP elevated from roughly 8,000 to 11,000.  Daily    Hospital Course:  Patient was recently hospitalized following a fall at her halfway.  Head imaging was performed, revealing no acute intracranial processes.  Chest imaging identified a recurrent right-sided pleural effusion, and patient underwent thoracenteses on 12/2 and  , with fluid removals of 800 ml and 600 ml.  Fluid cytoscopy and cultures were negative.  Imaging also revealed fractures of right 8th and 10th ribs.  Pain management recommended and performed an intercostal nerve block.  Patient reported minimal relief following the procedure, but declined a repeat attempt.  Patient was recommended a multimodal oral analgesic regimen, including Tylenol, Tramadol, Lidoderm patches, Tizanidine and Gabapentin.  Patient discharged to subacute setting for continued skilled therapies.      HISTORY  Past Medical History:   Diagnosis Date   • ABDOMINAL PAIN    • Arthritis     all joints   • Bronchitis    • Chronic pain     joints   • CONFUSION    • ESOPHAGITIS REFLUX    • Fracture     wrists, arm,    • Gastroesophageal reflux disease    • Hypertension    • Liver disease     abcess on liver   • Mixed stress and urge urinary incontinence    • OC (onychocryptosis)    • OM (onychomycosis)    • Osteoporosis 2010    on actonel   • PMH of 2002    broken left radius, no surgery   • PMH of 10/01/2008    fracture right wrist   • PMH of 1950    multiple fractures - femurs & pelvis   • Sinusitis, chronic     chronic sinus drainage   • Special screening for malignant neoplasms, colon 2014    Colonoscopy/Santo   • Varicose vein of leg     right      reports that she quit smoking about 62 years ago. Her smoking use included cigarettes. She has a 2.50 pack-year smoking history. She has never used smokeless tobacco. She reports current alcohol use of about 1.0 standard drink of alcohol per week. She reports that she does not use drugs.  Past Surgical History:   Procedure Laterality Date   • Carpal tunnel release  2009    Carpal Tunnel, right   •  delivery+postpartum care  1951       •  delivery+postpartum care  11/15/1952       •  delivery+postpartum care  1951       •  delivery+postpartum care   1952       • Colonoscopy diagnostic  2007    There were numerous diverticula in the ascending and proximal transverse colon, no gross evidence fo diverticulitis.     • Colonoscopy diagnostic  2003    Diveticulosis coli, otherwise neg.   • Colorec canc scrn,colonoscopy not hi risk  2014    Dr Santo/no further screening recommended due to age   • Elbow surgery Right     dislocated, pin insertion   • Esophagogastroduodenoscopy transoral flex w/bx single or mult  2007    Antrum bx: acute ulceration gastritis w/background mucosal changes most consistent w/chronic reactive gastropathy to chemical injury.   • Esophagogastroduodenoscopy transoral flex w/bx single or mult  2003    GE junction bx: reflux esophagitis.  Gastric type mucosa w/moderate chronic inflammation, neg for intestinal metaplasia and dysplasia.  Sigmoid polyp: hyperplastic polyp.   • Esophagogastroduodenoscopy transoral flex w/bx single or mult  2009   • Extracapsular cataract removal w insert io lens prosth wo ecp  2003    Cataract Removal Lens Implant left eye.   • Extracapsular cataract removal w insert io lens prosth wo ecp  2003    Cataract Removal Lens Implant right eye.   • Femur/knee surg unlisted  1950    multiple fxs - Femur plates placed bilaterall, in body cast   • Forearm/wrist surgery unlisted  10/08    Wrist procedure   • Removal gallbladder  1993    Cholecystectomy (gallbladder)   • Shoulder surg proc unlisted  1980    Right rotator cuff    • Shoulder surg proc unlisted  2006    Left rotator cuff   • Tonsillectomy     • Total hip replacement  1999    Hip replacement, right   • Total hip replacement  2011    Hip replacement left   • Total knee replacement  1993    Left Knee Replacement   • Total knee replacement  2002    Right Knee Replacement   • Total knee replacement  2002    Left Knee-cap Replacement     Family History    Problem Relation Age of Onset   • Cancer Mother         colorectal    • Cancer Sister         colorectal   • Cancer Brother         prostate/liver     History     Not marked as reviewed during this visit.          PROBLEM LIST:  Patient Active Problem List   Diagnosis   • Osteoarthritis   • Osteoporosis   • Urinary, incontinence, stress female   • GERD (gastroesophageal reflux disease)   • HTN (hypertension)   • Mixed stress and urge urinary incontinence   • Bilateral foot pain   • Dermatophytosis of nail   • Pain in joint, ankle and foot   • Thoracic or lumbosacral neuritis or radiculitis, unspecified   • Spinal stenosis, unspecified region other than cervical   • Degeneration of lumbar or lumbosacral intervertebral disc   • Degeneration of cervical intervertebral disc   • Plantar fasciitis of right foot   • Shortness of breath   • Aortic valve disorder   • Other persistent atrial fibrillation (CMS/HCC)   • Chronic heart failure with preserved ejection fraction (HFpEF) (CMS/HCC)   • Hypertensive heart and renal disease with congestive heart failure (CMS/HCC)   • Stage 3b chronic kidney disease (CMS/HCC)   • Pleural effusion on right   • Closed fracture of multiple ribs of right side with routine healing   • COVID-19 virus detected       ADVANCE CARE PLANNING:       Advance Care Planning     PCP:  Moraima BROWN MD  Discussion Leader:  Cam NAVARRO  Participants: Patient, JOBYNP  Location:  [facility]    Advance Care Planning Discussion    Patient and/or Substitute Decision-Maker agree to Advance Care Planning discussion.    Does the patient have the capacity to make healthcare decisions: Yes, the patient is able to receive, process, and then give feedback to information regarding medical discussions.      Are the current Advance Directive documents consistent with the Patient's current wishes: Yes      Summary of Discussion:     Does the patient have a state-issued Ambulatory Code Status ([place]-POLST [place]-DNR) order:  Yes  After discussion, the patient has decided on Do Not Resuscitate    Discussion Start Time: 0800  Discussion End Time: 0816    RAMON Celestin               DEPRESSION SCREENING:  Recent PHQ 2/9 Score    PHQ 2:  Date Adult PHQ 2 Score Adult PHQ 2 Interpretation   11/28/2021 2 No further screening needed       PHQ 9:  Date Adult PHQ 9 Score Adult PHQ 9 Interpretation   9/7/2021 6 Mild Depression       DEPRESSION ASSESSMENT/PLAN:  Depression screening is negative no further plan needed.    ALLERGIES:  Allergies as of 12/22/2021 - Reviewed 12/08/2021   Allergen Reaction Noted   • Mirabegron Other (See Comments) 10/28/2016   • Morphine sulfate HIVES 03/23/2018       CURRENT MEDICATIONS:   Current Outpatient Medications   Medication Sig Dispense Refill   • bumetanide (Bumex) 1 MG tablet Take 1 tablet by mouth 3 times daily for 3 days. 9 tablet 0   • guaiFENesin (Mucinex) 600 MG 12 hr tablet Take 2 tablets by mouth 2 times daily. 40 tablet 0   • traMADol (ULTRAM) 50 MG tablet 25 mg daily and 50 mg at night( until acute pain is resolved) 30 tablet 0   • colchicine (COLCRYS) 0.6 MG tablet 0.3 mg two times per week( Monday and Thursday) 30 tablet 3   • gabapentin (NEURONTIN) 100 MG capsule Take 1 capsule by mouth every 12 hours. 60 capsule 0   • lidocaine (LIDOCARE) 4 % patch Place 2 patches onto the skin daily. Apply to the right lower rib cage.     • tiZANidine (ZANAFLEX) 2 MG tablet Take 1 tablet by mouth every 8 hours as needed (muscle spams). 30 tablet 0   • metoPROLOL succinate (TOPROL-XL) 25 MG 24 hr tablet Take 25 mg by mouth 2 times daily. Take 1 tablet with a 50mg tablet for 75mg total     • metoPROLOL succinate (TOPROL-XL) 50 MG 24 hr tablet Take 50 mg by mouth daily. Take 1 tablet with a 25mg tablet for 75mg total     • pravastatin (PRAVACHOL) 20 MG tablet Take 1 tablet by mouth nightly. 30 tablet 0   • Eliquis 2.5 MG Tab TAKE 1 TABLET BY MOUTH EVERY 12 HOURS. 180 tablet 3   • furosemide (LASIX) 40 MG  tablet Take 1 tablet by mouth 2 times daily. 120 tablet 0   • Potassium 99 MG Tab Take 99 mg by mouth 3 days a week. Tuesday, Thursday and Saturday.  30 tablet 0   • Calcium Carbonate-Vit D-Min (Calcium 1200) 6256-0815 MG-UNIT Chew Tab Chew 1 tablet by mouth daily.     • Multiple Vitamins-Minerals (CENTRUM SILVER PO) Take 1 tablet by mouth daily.     • loperamide (IMODIUM A-D) 2 MG tablet Take 2 mg by mouth as needed (to prevent Diarrhea.). To be taken when away from home.     • Magnesium 400 MG Tab Take 250 mg by mouth 3 days a week. Monday, Wednesday and Friday.     • Omega-3 Fatty Acids (FISH OIL) 1200 MG capsule Take 1,200 mg by mouth daily. Monday/Wednesday/Friday     • Biotin 2.5 MG Tab Take 1 tablet by mouth 3 days a week. Monday/Wednesday/Friday     • cholecalciferol (VITAMIN D) 25 mcg (1,000 units) tablet Take 50 mcg by mouth 2 times daily.      • acetaminophen (TYLENOL EX ST ARTHRITIS PAIN) 500 MG tablet Take 500 mg by mouth nightly.  30 tablet 0     No current facility-administered medications for this visit.     Medications reviewed / reconciled: Yes    BASELINE FUNCTIONAL STATUS:  Walker    CURRENT FUNCTIONAL STATUS:  Walker and Wheelchair    DIET:  Consistency: General   Type: regular  Appetite: Normal    REVIEW OF SYSTEMS:  Review of Systems   Constitutional: Negative.    HENT: Negative.    Respiratory: Positive for cough. Negative for shortness of breath and wheezing.    Cardiovascular: Negative.    Gastrointestinal: Negative.    Genitourinary: Negative.    Musculoskeletal: Positive for arthralgias and myalgias.   Skin: Negative.    Neurological: Negative.    Psychiatric/Behavioral: Negative.        VITALS:    150/80  98.0  95  18  95%  127 lbs    PHYSICAL ASSESSMENT:  Physical Exam  Vitals reviewed.   Constitutional:       Appearance: Normal appearance.   Eyes:      Pupils: Pupils are equal, round, and reactive to light.   Cardiovascular:      Rate and Rhythm: Normal rate and regular rhythm.       Pulses: Normal pulses.      Heart sounds: No murmur heard.      Pulmonary:      Effort: Pulmonary effort is normal. No respiratory distress.      Breath sounds: Normal breath sounds. No stridor. No wheezing, rhonchi or rales.   Abdominal:      General: Bowel sounds are normal.      Palpations: Abdomen is soft.   Musculoskeletal:      Cervical back: Normal range of motion.      Right lower leg: No edema.      Left lower leg: No edema.   Skin:     General: Skin is warm.      Capillary Refill: Capillary refill takes less than 2 seconds.   Neurological:      General: No focal deficit present.      Mental Status: She is alert and oriented to person, place, and time.   Psychiatric:         Mood and Affect: Mood normal.         Behavior: Behavior normal.         LABS:  CBC:   WBC (K/mcL)   Date Value   12/09/2021 9.5     RBC (mil/mcL)   Date Value   12/09/2021 4.70     HGB (g/dL)   Date Value   12/09/2021 13.1     PLT (K/mcL)   Date Value   12/09/2021 406   , BMP:   Sodium (mmol/L)   Date Value   12/09/2021 129 (L)     Potassium (mmol/L)   Date Value   12/09/2021 4.3     Chloride (mmol/L)   Date Value   12/09/2021 96 (L)     Glucose (mg/dL)   Date Value   12/09/2021 87     Calcium (mg/dL)   Date Value   12/09/2021 9.6     Carbon Dioxide (mmol/L)   Date Value   12/09/2021 28     BUN (mg/dL)   Date Value   12/09/2021 36 (H)     Creatinine (mg/dL)   Date Value   12/09/2021 1.35 (H)    and LIPID Panel:   CHOLESTEROL (mg/dL)   Date Value   09/05/2018 199     HDL (mg/dL)   Date Value   09/05/2018 89     CHOL/HDL (no units)   Date Value   09/05/2018 2.2     TRIGLYCERIDE (mg/dL)   Date Value   09/05/2018 89     CALCULATED LDL (mg/dL)   Date Value   09/05/2018 92       ASSESSMENT AND PLAN  Assessment     Multiple right rib fractures (primary encounter diagnosis)    Plan:  Continue PT/OT with adequate pain management.  Tylenol, Tramadol, Lidoderm, Tizanidine and Gabapentin.  Scheduling Tylenol 1,000 mg TID.  Scheduling Tizanidine  TID.  Increasing Gabapentin to TID.  Repeat imaging if pain or respiratory status acutely worsens.    Chronic heart failure with preserved ejection fraction (HFpEF) (CMS/LTAC, located within [facility])     Plan:  Pro-BNP 11,000 from 8,000.  Bumex 1 mg TID for 3 days, then Bumex 1 mg BID.  Monitor weight daily, and for signs of fluid volume overload.  Monitor BMP weekly.  Baseline creatinine 1.2-1.4.    Other persistent atrial fibrillation (CMS/LTAC, located within [facility])    Plan: Chronic, stable.  Continue Eliquis.  Rate controlled on Metoprolol.    Hypertensive heart and renal disease with congestive heart failure (CMS/LTAC, located within [facility])    Plan: Bumex 1 mg TID for 3 days, then Bumex 1 mg BID.  Monitor weight daily, and for signs of fluid volume overload.  Monitor BMP weekly.  Baseline creatinine 1.2-1.4.      Stage 3b chronic kidney disease (CMS/HCC)    Plan: Chronic, stable.  Monitor weight daily, and for signs of fluid volume overload.  Monitor BMP weekly.  Baseline creatinine 1.2-1.4.  Avoid NSAIDs and nephrotoxic agents.    Pleural effusion on right    Plan: Chronic, re-visualized on 12/21.  Had thoracenteses on 12/2 and 12/6.  Appears nonmalignant.  Optimize diuretic therapy, currently on Bumex 1 mg TID for symptom management.  Pulmonary referral in process.  Cardio-thoracic follow-up on 1/28.  Patient may require Pleur-X placement for continued symptom management.  Discuss palliative options as appropriate.    Covid-19 detected    Plan:  Patient tested positive on 12/16.  Monitor vitals and respiratory status every shift.  Mucinex 1,200 mg BID.  Supplemental oxygen as needed.  CBC and BMP on 12/21.  Repeat CXR as clinically appropriate.  Consider Proair, Duonebs, Dexamethasone should wheezing or worsening SOB develop.      FOLLOW UP APPOINTMENTS:  Future Appointments   Date Time Provider Department Center   1/28/2022 10:30 AM [facility]   1/28/2022  2:15 PM MD [facility]       DISCHARGE PLANNING:     Prognosis:  good    Discussed with: Patient    Barriers to discharge: therapy needs    Anticipated disposition: Home With Home Health     Total time spent is more than 45 minutes, with more than 50% of the time spent in coordination of care, counseling, review of records and discussion of plan of care with the patient /staff /family.    RAMON Celestin   Jones

## 2022-07-13 NOTE — ASU PATIENT PROFILE, ADULT - TEACHING/LEARNING FACTORS IMPACT ABILITY TO LEARN
Gentamicin 1 drop 3 times per day for 7 days in the right eye     Tobradex ointment in the right eye for 1 month   none preop/anxiety

## 2022-07-13 NOTE — ASU PATIENT PROFILE, ADULT - MUTUALITY COMMENT, PROFILE
n/a Discussed with Pt & his support person their ability to have questions answered before going into the OR

## 2022-07-14 ENCOUNTER — TRANSCRIPTION ENCOUNTER (OUTPATIENT)
Age: 62
End: 2022-07-14

## 2022-07-14 ENCOUNTER — APPOINTMENT (OUTPATIENT)
Dept: ORTHOPEDIC SURGERY | Facility: HOSPITAL | Age: 62
End: 2022-07-14

## 2022-07-14 ENCOUNTER — OUTPATIENT (OUTPATIENT)
Dept: OUTPATIENT SERVICES | Facility: HOSPITAL | Age: 62
LOS: 1 days | Discharge: HOME | End: 2022-07-14

## 2022-07-14 ENCOUNTER — RESULT REVIEW (OUTPATIENT)
Age: 62
End: 2022-07-14

## 2022-07-14 VITALS
TEMPERATURE: 98 F | RESPIRATION RATE: 16 BRPM | DIASTOLIC BLOOD PRESSURE: 85 MMHG | HEART RATE: 59 BPM | SYSTOLIC BLOOD PRESSURE: 150 MMHG | OXYGEN SATURATION: 98 %

## 2022-07-14 VITALS
RESPIRATION RATE: 14 BRPM | TEMPERATURE: 98 F | DIASTOLIC BLOOD PRESSURE: 80 MMHG | HEIGHT: 65.5 IN | OXYGEN SATURATION: 97 % | HEART RATE: 65 BPM | WEIGHT: 160.06 LBS | SYSTOLIC BLOOD PRESSURE: 134 MMHG

## 2022-07-14 DIAGNOSIS — R22.40 LOCALIZED SWELLING, MASS AND LUMP, UNSPECIFIED LOWER LIMB: ICD-10-CM

## 2022-07-14 DIAGNOSIS — Z90.89 ACQUIRED ABSENCE OF OTHER ORGANS: Chronic | ICD-10-CM

## 2022-07-14 DIAGNOSIS — Z90.49 ACQUIRED ABSENCE OF OTHER SPECIFIED PARTS OF DIGESTIVE TRACT: Chronic | ICD-10-CM

## 2022-07-14 DIAGNOSIS — Z98.890 OTHER SPECIFIED POSTPROCEDURAL STATES: Chronic | ICD-10-CM

## 2022-07-14 PROCEDURE — 88334 PATH CONSLTJ SURG CYTO XM EA: CPT | Mod: 26,59

## 2022-07-14 PROCEDURE — 88305 TISSUE EXAM BY PATHOLOGIST: CPT | Mod: 26

## 2022-07-14 PROCEDURE — 27334 REMOVE KNEE JOINT LINING: CPT | Mod: RT

## 2022-07-14 PROCEDURE — 88331 PATH CONSLTJ SURG 1 BLK 1SPC: CPT | Mod: 26

## 2022-07-14 PROCEDURE — 27329 RESECT THIGH/KNEE TUM < 5 CM: CPT | Mod: RT

## 2022-07-14 RX ORDER — FENTANYL CITRATE 50 UG/ML
50 INJECTION INTRAVENOUS
Refills: 0 | Status: DISCONTINUED | OUTPATIENT
Start: 2022-07-14 | End: 2022-07-14

## 2022-07-14 RX ORDER — MELOXICAM 15 MG/1
1 TABLET ORAL
Qty: 0 | Refills: 0 | DISCHARGE

## 2022-07-14 RX ORDER — HYDROMORPHONE HYDROCHLORIDE 2 MG/ML
0.5 INJECTION INTRAMUSCULAR; INTRAVENOUS; SUBCUTANEOUS ONCE
Refills: 0 | Status: DISCONTINUED | OUTPATIENT
Start: 2022-07-14 | End: 2022-07-14

## 2022-07-14 RX ORDER — ONDANSETRON 8 MG/1
4 TABLET, FILM COATED ORAL ONCE
Refills: 0 | Status: DISCONTINUED | OUTPATIENT
Start: 2022-07-14 | End: 2022-07-28

## 2022-07-14 NOTE — ASU DISCHARGE PLAN (ADULT/PEDIATRIC) - NS MD DC FALL RISK RISK
For information on Fall & Injury Prevention, visit: [email or web address] OR  [email or web address] OR  https://www.cdc.gov/steadi/patient.html

## 2022-07-14 NOTE — ASU DISCHARGE PLAN (ADULT/PEDIATRIC) - CARE PROVIDER_API CALL
Joaquin Luna (MD)  Orthopaedic Surgery  [address]  [place] [address]  Phone: [phone]  Fax: [phone]  Follow Up Time: 2 weeks

## 2022-07-14 NOTE — ASU DISCHARGE PLAN (ADULT/PEDIATRIC) - ASU DC SPECIAL INSTRUCTIONSFT
FOLLOW UP: Please follow up with your surgeon in 2 weeks, call to make an appt.  DIET: You may resume your regular diet.   WOUND CARE:  Please keep dressing in place until seen for follow up.   BATHING: Keep your surgical area clean and dry. You may shower or use sponge bath to bathe.  Do not submerge wound underwater.   PAIN CONTROL:  Alternate between taking Ibuprofen and Tylenol so you are taking pain medication every 3 to 4 hours.   It is important to ice and elevate your leg to keep swelling down and the pain manageable. Keep the ice on for 20 minutes, and then keep off for 20 minutes. Repeat while awake.  MEDICATIONS: Take all medications as prescribed.   ACTIVITY: You may bear weight on the right leg as tolerated.   NOTIFY YOUR SURGEON IF: You have any bleeding that does not stop, any pus draining from your wound(s), any fever (over 100.4 F) or chills, persistent nausea/vomiting, persistent diarrhea, or if your pain is not controlled on your discharge pain medications.

## 2022-07-14 NOTE — ASU DISCHARGE PLAN (ADULT/PEDIATRIC) - FOLLOW UP APPOINTMENTS
[facility] may also call Recovery Room (PACU) 24/7 @ [phone]/[facility]

## 2022-07-18 LAB — SARS-COV-2 N GENE NPH QL NAA+PROBE: NOT DETECTED

## 2022-07-27 ENCOUNTER — APPOINTMENT (OUTPATIENT)
Dept: ORTHOPEDIC SURGERY | Facility: CLINIC | Age: 62
End: 2022-07-27

## 2022-07-27 VITALS
WEIGHT: 170 LBS | DIASTOLIC BLOOD PRESSURE: 85 MMHG | BODY MASS INDEX: 26.68 KG/M2 | HEIGHT: 67 IN | SYSTOLIC BLOOD PRESSURE: 146 MMHG

## 2022-07-27 PROCEDURE — 99024 POSTOP FOLLOW-UP VISIT: CPT

## 2022-07-27 NOTE — HISTORY OF PRESENT ILLNESS
[___ Weeks Post Op] : [unfilled] weeks post op [Clean/Dry/Intact] : clean, dry and intact [Swelling] : swollen [Neuro Intact] : an unremarkable neurological exam [Vascular Intact] : ~T peripheral vascular exam normal [Negative Zohaib's] : maneuvers demonstrated a negative Zohaib's sign [Doing Well] : is doing well [Excellent Pain Control] : has excellent pain control [de-identified] : 7/14/2022 - s/p resection of right knee mass anteriorly. [de-identified] : On exam patient is doing very well.  He has almost no pain and is not taking her pain medicine.  He is walking almost completely normally. [de-identified] : On exam his incision is clean dry and intact.  He has full active extension with no pain.  He has a little bit of swelling right incision consistent with seroma medially.  He has normal sensation. 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 [de-identified] : Doing well 2 weeks postoperatively.  He has been doing some therapy and continue to get better.  I like to see him again in 6 weeks for final check unless he is fine and does not need to come back.  If the swelling is still an issue I can aspirate at that point. [de-identified] : He understands this is benign lesion unlikely to recur or cause problems in the future.\par \par If imaging was ordered, the patient was told to make an appointment to review findings right after all imaging is completed.\par \par We discussed risks, benefits and alternatives. Rationale of care was reviewed and all questions were answered. Patient (and family) had all questions answered to her degree of the level of satisfaction. Patient (and family) expressed understanding and interest in proceeding with the plan as outlined.\par \par \par \par \par This note was done with a voice recognition transcription software and any typos are related to this rather than medical error. Surgical risks reviewed. Patient (and family) had all questions answered to an agreeable level of satisfaction. Patient (and family) expressed understanding and interest in proceeding with the plan as outlined.  \par

## 2022-11-01 ENCOUNTER — APPOINTMENT (OUTPATIENT)
Dept: FAMILY MEDICINE | Facility: CLINIC | Age: 62
End: 2022-11-01

## 2022-11-01 ENCOUNTER — NON-APPOINTMENT (OUTPATIENT)
Age: 62
End: 2022-11-01

## 2022-11-01 VITALS
TEMPERATURE: 97 F | DIASTOLIC BLOOD PRESSURE: 80 MMHG | SYSTOLIC BLOOD PRESSURE: 130 MMHG | BODY MASS INDEX: 26.84 KG/M2 | HEART RATE: 73 BPM | HEIGHT: 67 IN | OXYGEN SATURATION: 98 % | RESPIRATION RATE: 12 BRPM | WEIGHT: 171 LBS

## 2022-11-01 DIAGNOSIS — Z00.00 ENCOUNTER FOR GENERAL ADULT MEDICAL EXAMINATION W/OUT ABNORMAL FINDINGS: ICD-10-CM

## 2022-11-01 DIAGNOSIS — Z23 ENCOUNTER FOR IMMUNIZATION: ICD-10-CM

## 2022-11-01 PROCEDURE — G0008: CPT

## 2022-11-01 PROCEDURE — 99396 PREV VISIT EST AGE 40-64: CPT | Mod: 25

## 2022-11-01 PROCEDURE — 90686 IIV4 VACC NO PRSV 0.5 ML IM: CPT

## 2022-11-01 RX ORDER — VARENICLINE TARTRATE 0.5 (11)-1
0.5 MG X 11 & KIT ORAL
Qty: 1 | Refills: 0 | Status: COMPLETED | COMMUNITY
Start: 2021-04-26 | End: 2022-11-01

## 2022-11-01 RX ORDER — NAPROXEN 500 MG/1
500 TABLET ORAL
Qty: 30 | Refills: 3 | Status: COMPLETED | COMMUNITY
Start: 2020-12-01 | End: 2022-11-01

## 2022-11-01 RX ORDER — METHYLPREDNISOLONE 4 MG/1
4 TABLET ORAL
Qty: 21 | Refills: 1 | Status: COMPLETED | COMMUNITY
Start: 2021-04-26 | End: 2022-11-01

## 2022-11-01 NOTE — COUNSELING
[Fall prevention counseling provided] : Fall prevention counseling provided [Adequate lighting] : Adequate lighting [No throw rugs] : No throw rugs [Use proper foot wear] : Use proper foot wear [Use recommended devices] : Use recommended devices [Sleep ___ hours/day] : Sleep [unfilled] hours/day [Engage in a relaxing activity] : Engage in a relaxing activity [AUDIT-C Screening administered and reviewed] : AUDIT-C Screening administered and reviewed [Encouraged to increase physical activity] : Encouraged to increase physical activity [____ min/wk Activity] : [unfilled] min/wk activity [None] : None [Good understanding] : Patient has a good understanding of lifestyle changes and steps needed to achieve self management goal

## 2022-11-03 LAB
ALBUMIN SERPL ELPH-MCNC: 4.2 G/DL
ALP BLD-CCNC: 96 U/L
ALT SERPL-CCNC: 26 U/L
ANION GAP SERPL CALC-SCNC: 12 MMOL/L
APPEARANCE: CLEAR
AST SERPL-CCNC: 18 U/L
BACTERIA: NEGATIVE
BASOPHILS # BLD AUTO: 0.09 K/UL
BASOPHILS NFR BLD AUTO: 0.8 %
BILIRUB SERPL-MCNC: 0.8 MG/DL
BILIRUBIN URINE: NEGATIVE
BLOOD URINE: NEGATIVE
BUN SERPL-MCNC: 18 MG/DL
CALCIUM SERPL-MCNC: 9.6 MG/DL
CHLORIDE SERPL-SCNC: 103 MMOL/L
CHOLEST SERPL-MCNC: 205 MG/DL
CO2 SERPL-SCNC: 27 MMOL/L
COLOR: YELLOW
CREAT SERPL-MCNC: 1.05 MG/DL
EGFR: 81 ML/MIN/1.73M2
EOSINOPHIL # BLD AUTO: 0.3 K/UL
EOSINOPHIL NFR BLD AUTO: 2.8 %
ESTIMATED AVERAGE GLUCOSE: 131 MG/DL
GLUCOSE QUALITATIVE U: NEGATIVE
GLUCOSE SERPL-MCNC: 102 MG/DL
HBA1C MFR BLD HPLC: 6.2 %
HCT VFR BLD CALC: 45.9 %
HDLC SERPL-MCNC: 49 MG/DL
HGB BLD-MCNC: 15.2 G/DL
HYALINE CASTS: 0 /LPF
IMM GRANULOCYTES NFR BLD AUTO: 0.2 %
KETONES URINE: NEGATIVE
LDLC SERPL CALC-MCNC: 128 MG/DL
LEUKOCYTE ESTERASE URINE: NEGATIVE
LYMPHOCYTES # BLD AUTO: 2.81 K/UL
LYMPHOCYTES NFR BLD AUTO: 26.5 %
MAN DIFF?: NORMAL
MCHC RBC-ENTMCNC: 30.8 PG
MCHC RBC-ENTMCNC: 33.1 GM/DL
MCV RBC AUTO: 93.1 FL
MICROSCOPIC-UA: NORMAL
MONOCYTES # BLD AUTO: 0.77 K/UL
MONOCYTES NFR BLD AUTO: 7.3 %
NEUTROPHILS # BLD AUTO: 6.6 K/UL
NEUTROPHILS NFR BLD AUTO: 62.4 %
NITRITE URINE: NEGATIVE
NONHDLC SERPL-MCNC: 156 MG/DL
PH URINE: 6.5
PLATELET # BLD AUTO: 355 K/UL
POTASSIUM SERPL-SCNC: 4.8 MMOL/L
PROT SERPL-MCNC: 7.2 G/DL
PROTEIN URINE: NORMAL
PSA SERPL-MCNC: 1.38 NG/ML
RBC # BLD: 4.93 M/UL
RBC # FLD: 13.2 %
RED BLOOD CELLS URINE: 1 /HPF
SODIUM SERPL-SCNC: 142 MMOL/L
SPECIFIC GRAVITY URINE: 1.03
SQUAMOUS EPITHELIAL CELLS: 0 /HPF
TRIGL SERPL-MCNC: 138 MG/DL
TSH SERPL-ACNC: 1.73 UIU/ML
UROBILINOGEN URINE: NORMAL
WBC # FLD AUTO: 10.59 K/UL
WHITE BLOOD CELLS URINE: 0 /HPF

## 2022-11-08 LAB
AMPHET UR-MCNC: NEGATIVE
BARBITURATES UR-MCNC: NEGATIVE
BENZODIAZ UR-MCNC: NEGATIVE
COCAINE METAB.OTHER UR-MCNC: NEGATIVE
CREATININE, URINE: 249.3 MG/DL
METHADONE UR-MCNC: NEGATIVE
METHAQUALONE UR-MCNC: NEGATIVE
OPIATES UR-MCNC: NEGATIVE
PCP UR-MCNC: NEGATIVE
PROPOXYPH UR QL: NEGATIVE
THC UR QL: NEGATIVE

## 2022-11-08 NOTE — HISTORY OF PRESENT ILLNESS
[FreeTextEntry1] : Physical exam [de-identified] : This is a 61 y/o male with PMHx significant for BPH, Chronic knee pain, presenting for CPE

## 2022-11-08 NOTE — HEALTH RISK ASSESSMENT
[Good] : ~his/her~  mood as  good [Never] : Never [No] : In the past 12 months have you used drugs other than those required for medical reasons? No [No falls in past year] : Patient reported no falls in the past year [0] : 2) Feeling down, depressed, or hopeless: Not at all (0) [PHQ-2 Negative - No further assessment needed] : PHQ-2 Negative - No further assessment needed [Patient reported colonoscopy was normal] : Patient reported colonoscopy was normal [None] : None [With Family] : lives with family [# of Members in Household ___] :  household currently consist of [unfilled] member(s) [Employed] : employed [] :  [# Of Children ___] : has [unfilled] children [Sexually Active] : sexually active [Feels Safe at Home] : Feels safe at home [Fully functional (bathing, dressing, toileting, transferring, walking, feeding)] : Fully functional (bathing, dressing, toileting, transferring, walking, feeding) [Fully functional (using the telephone, shopping, preparing meals, housekeeping, doing laundry, using] : Fully functional and needs no help or supervision to perform IADLs (using the telephone, shopping, preparing meals, housekeeping, doing laundry, using transportation, managing medications and managing finances) [Smoke Detector] : smoke detector [Carbon Monoxide Detector] : carbon monoxide detector [Seat Belt] :  uses seat belt [With Patient/Caregiver] : , with patient/caregiver [Aggressive treatment] : aggressive treatment [Audit-CScore] : 0 [de-identified] : none [de-identified] : regular, soups [BME1Zlmff] : 0 [Change in mental status noted] : No change in mental status noted [Language] : denies difficulty with language [Behavior] : denies difficulty with behavior [Learning/Retaining New Information] : denies difficulty learning/retaining new information [Handling Complex Tasks] : denies difficulty handling complex tasks [Reasoning] : denies difficulty with reasoning [Spatial Ability and Orientation] : denies difficulty with spatial ability and orientation [Reports changes in hearing] : Reports no changes in hearing [Reports changes in vision] : Reports no changes in vision [Reports changes in dental health] : Reports no changes in dental health [Guns at Home] : no guns at home [ColonoscopyDate] : 2013 [ColonoscopyComments] : Repeat 2023 [HIVDate] : 5/21 [HIVComments] : non reactive [HepatitisCDate] : 5/21 [HepatitisCComments] : non reactive [de-identified] : 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

## 2022-11-08 NOTE — ASSESSMENT
[FreeTextEntry1] : This is a 62-year-old male with a past medical history of BPH, Erectile dysfunction, Chronic knee pain, presenting to the office for CPE\par \par MSK/RHEUM: Hip arthritis, Knee pain\par -Bilateral Hip Xray performed, unremarkable\par -Pain control with OTC NSAIDs\par \par : History of BPH, ED.\par -Patient has been off Flomax for a long time, no current complains\par \par Healthcare maintenance:\par -Fasting blood work as outpt.\par -Patient received flu vaccine in house today\par -Last Colonoscopy 2013 with Dr Genao, repeat 2023\par -PFIZER Covid vaccines\par -Drug screen\par

## 2022-11-15 ENCOUNTER — APPOINTMENT (OUTPATIENT)
Dept: ORTHOPEDIC SURGERY | Facility: CLINIC | Age: 62
End: 2022-11-15

## 2022-11-15 PROCEDURE — 20610 DRAIN/INJ JOINT/BURSA W/O US: CPT

## 2022-11-15 PROCEDURE — 99213 OFFICE O/P EST LOW 20 MIN: CPT | Mod: 25

## 2022-11-15 NOTE — HISTORY OF PRESENT ILLNESS
[___ Months Post Op] : [unfilled] months post op [0] : no pain reported [Clean/Dry/Intact] : clean, dry and intact [Swelling] : swollen [Neuro Intact] : an unremarkable neurological exam [Vascular Intact] : ~T peripheral vascular exam normal [Negative Zohaib's] : maneuvers demonstrated a negative Zohaib's sign [Doing Well] : is doing well [Excellent Pain Control] : has excellent pain control [de-identified] : 7/14/2022 - s/p resection of right knee mass anteriorly. [de-identified] : On exam patient is doing very well.  He has almost no pain and is not taking her pain medicine.  He is walking almost completely normally. [de-identified] : On exam his incision is clean dry and intact.  He has full active extension with no pain.  He has a little bit of swelling right incision consistent with seroma medially.  He has normal sensation. 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 [de-identified] : I was able to aspirate this without a problem.  He may certainly come back if there is coming from the joint.  My recommendation is to follow-up again with his primary orthopedist. [de-identified] : Follow-up as needed.\par \par \par If imaging was ordered, the patient was told to make an appointment to review findings right after all imaging is completed.\par \par We discussed risks, benefits and alternatives. Rationale of care was reviewed and all questions were answered. Patient (and family) had all questions answered to her degree of the level of satisfaction. Patient (and family) expressed understanding and interest in proceeding with the plan as outlined.\par \par \par \par \par This note was done with a voice recognition transcription software and any typos are related to this rather than medical error. Surgical risks reviewed. Patient (and family) had all questions answered to an agreeable level of satisfaction. Patient (and family) expressed understanding and interest in proceeding with the plan as outlined.  \par

## 2022-11-15 NOTE — PROCEDURE
[Aspiration] : Aspiration [Right] : of the right [Prepatellar Bursa] : prepatellar bursa [Patient] : patient [Risk] : risk [Benefits] : benefits [Alternatives] : alternatives [Bleeding] : bleeding [Verbal Consent Obtained] : verbal consent was obtained prior to the procedure [Alcohol] : Alcohol [Betadine] : Betadine [Ethyl Chloride Spray] : ethyl chloride spray was used as a topical anesthetic [Direct] : direct [18] : an 18-gauge [___ mL Fluid] : [unfilled] mL of [Yellow] : yellow [Clear] : clear [Bandage Applied] : a bandage [Tolerated Well] : The patient tolerated the procedure well [de-identified] : Anterior knee collection

## 2023-03-30 ENCOUNTER — TRANSCRIPTION ENCOUNTER (OUTPATIENT)
Age: 63
End: 2023-03-30

## 2023-03-30 ENCOUNTER — APPOINTMENT (OUTPATIENT)
Dept: ORTHOPEDIC SURGERY | Facility: CLINIC | Age: 63
End: 2023-03-30
Payer: COMMERCIAL

## 2023-03-30 PROCEDURE — 99213 OFFICE O/P EST LOW 20 MIN: CPT

## 2023-03-30 PROCEDURE — 99072 ADDL SUPL MATRL&STAF TM PHE: CPT

## 2023-04-06 NOTE — REVIEW OF SYSTEMS
No [Joint Pain] : joint pain [Joint Stiffness] : joint stiffness [Joint Swelling] : joint swelling [Nl] : Hematologic/Lymphatic

## 2023-04-06 NOTE — DISCUSSION/SUMMARY
[All Questions Answered] : Patient (and family) had all questions answered to an agreeable level of satisfaction [Interested in Proceeding] : Patient (and family) expressed understanding and interest in proceeding with the plan as outlined [de-identified] : The patient is the patient is having significant swelling 8 months after surgery.  Under sterile conditions I attempted to aspirate the knee through a superolateral approach.  Approximately 40 cc of serous fluid came out for this reason I recommended a new MRI scan.  It is unlikely that he unlikely that he has a recurrence of this lesion however I would like to see this better.  This could just just be from the area of the resection leading some fluid out however I would like to see it better.  Alternatively he could have some other intra-articular pathology..  Follow-up again after MRI scan of the right knee with and without contrast.\par \par If imaging was ordered, the patient was told to make an appointment to review findings right after all imaging is completed.\par \par We discussed risks, benefits and alternatives. Rationale of care was reviewed and all questions were answered. Patient (and family) had all questions answered to her degree of the level of satisfaction. Patient (and family) expressed understanding and interest in proceeding with the plan as outlined.\par \par \par \par \par This note was done with a voice recognition transcription software and any typos are related to this rather than medical error. Surgical risks reviewed. Patient (and family) had all questions answered to an agreeable level of satisfaction. Patient (and family) expressed understanding and interest in proceeding with the plan as outlined.  \par

## 2023-04-06 NOTE — HISTORY OF PRESENT ILLNESS
[___ mths] : [unfilled] month(s) ago [2] : currently ~his/her~ pain is 2 out of 10 [Bending] : worsened by bending [Direct Pressure] : worsened by direct pressure [FreeTextEntry1] : Patient still has had on and off pain and swelling.  He currently does have swelling in the area.  He does not have any palpable mass.  He wanted to check this out as he still has pain on and off especially with the swelling.\par He is able to walk and walk but he is still has symptoms from this.

## 2023-04-06 NOTE — PHYSICAL EXAM
[General Appearance - Well-Appearing] : Well appearing [General Appearance - Well Nourished] : well nourished [Oriented To Time, Place, And Person] : Oriented to person, place, and time [Impaired Insight] : Insight and judgment were intact [Affect] : The affect was normal. [Mood] : the mood was normal [Sclera] : the sclera and conjunctiva were normal [Neck Cervical Mass (___cm)] : no neck mass was observed [Heart Rate And Rhythm] : heart rate was normal and rhythm regular [] : No respiratory distress [Bowel Sounds] : normal bowel sounds [Normal Station and Gait] : gait and station were normal [Tenderness] : tenderness [Swelling] : swelling [Incision Healed - Describe:] : Incision is healed ~M [Full ROM Unless otherwise noted:] : Full range of motion unless otherwise noted: [LE  Motor Strength Normal unless otherwise noted:] : 5/5 strength in bilateral lower extemities unless otherwise noted. [Normal] : Sensation intact to light touch. [2+] : left 2+ [FreeTextEntry1] : On exam the patient has a well-healed incision.  He does have some swelling in the knee especially anteriorly.  He seems to have somewhat of a ballotable suprapatellar pouch however this is not obviously purulent fluid.  He has range of motion of 0 to 120 degrees.  He stable to varus and valgus testing.  He has no palpable masses. [Masses] : no masses [Skin Changes - Describe changes:] : No skin changes noted

## 2023-04-20 ENCOUNTER — APPOINTMENT (OUTPATIENT)
Dept: MRI IMAGING | Facility: CLINIC | Age: 63
End: 2023-04-20
Payer: COMMERCIAL

## 2023-04-20 ENCOUNTER — OUTPATIENT (OUTPATIENT)
Dept: OUTPATIENT SERVICES | Facility: HOSPITAL | Age: 63
LOS: 1 days | End: 2023-04-20

## 2023-04-20 DIAGNOSIS — M25.461 EFFUSION, RIGHT KNEE: ICD-10-CM

## 2023-04-20 DIAGNOSIS — Z90.49 ACQUIRED ABSENCE OF OTHER SPECIFIED PARTS OF DIGESTIVE TRACT: Chronic | ICD-10-CM

## 2023-04-20 DIAGNOSIS — Z90.89 ACQUIRED ABSENCE OF OTHER ORGANS: Chronic | ICD-10-CM

## 2023-04-20 DIAGNOSIS — Z98.890 OTHER SPECIFIED POSTPROCEDURAL STATES: Chronic | ICD-10-CM

## 2023-04-20 PROCEDURE — 73723 MRI JOINT LWR EXTR W/O&W/DYE: CPT | Mod: 26,RT

## 2023-05-01 ENCOUNTER — APPOINTMENT (OUTPATIENT)
Dept: ORTHOPEDIC SURGERY | Facility: CLINIC | Age: 63
End: 2023-05-01
Payer: COMMERCIAL

## 2023-05-01 VITALS
DIASTOLIC BLOOD PRESSURE: 84 MMHG | HEIGHT: 67 IN | BODY MASS INDEX: 24.33 KG/M2 | TEMPERATURE: 98.6 F | HEART RATE: 75 BPM | WEIGHT: 155 LBS | SYSTOLIC BLOOD PRESSURE: 148 MMHG | OXYGEN SATURATION: 98 %

## 2023-05-01 PROCEDURE — 99214 OFFICE O/P EST MOD 30 MIN: CPT

## 2023-05-01 PROCEDURE — 99072 ADDL SUPL MATRL&STAF TM PHE: CPT

## 2023-05-01 NOTE — DATA REVIEWED
[Imaging Present] : Present [de-identified] : MRI of the right knee from April 20, 2023 shows:\par IMPRESSION: Large joint effusion with marked internal synovitis and joint margin erosions. An infectious or inflammatory arthropathy is favored. 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

## 2023-05-01 NOTE — REASON FOR VISIT
[FreeTextEntry1] : Follow-up MRI of right knee\par 7/14/2022 - s/p resection of right knee mass fibroma of tendon sheath.\par

## 2023-05-01 NOTE — PHYSICAL EXAM
[FreeTextEntry1] : On exam the patient has a well-healed incision.  He does have some swelling in the knee especially anteriorly.  He seems to have somewhat of a ballotable suprapatellar pouch however this is not obviously purulent fluid.  He has range of motion of 0 to 120 degrees.  He stable to varus and valgus testing.  He has no palpable masses. [General Appearance - Well-Appearing] : Well appearing [General Appearance - Well Nourished] : well nourished [Oriented To Time, Place, And Person] : Oriented to person, place, and time [Affect] : The affect was normal. [Impaired Insight] : Insight and judgment were intact [Mood] : the mood was normal [Sclera] : the sclera and conjunctiva were normal [Neck Cervical Mass (___cm)] : no neck mass was observed [Heart Rate And Rhythm] : heart rate was normal and rhythm regular [] : No respiratory distress [Bowel Sounds] : normal bowel sounds [Normal Station and Gait] : gait and station were normal [Tenderness] : tenderness [Swelling] : swelling [Masses] : no masses [Skin Changes - Describe changes:] : No skin changes noted [Incision Healed - Describe:] : Incision is healed ~M [Full ROM Unless otherwise noted:] : Full range of motion unless otherwise noted: [LE  Motor Strength Normal unless otherwise noted:] : 5/5 strength in bilateral lower extemities unless otherwise noted. [Normal] : Sensation intact to light touch. [2+] : left 2+

## 2023-05-01 NOTE — HISTORY OF PRESENT ILLNESS
[FreeTextEntry1] : Since last I saw the patient and did aspiration injection helped him for about a week.  He is still having significant pain.  He still has the same swelling.

## 2023-05-01 NOTE — DISCUSSION/SUMMARY
[Surgical risks reviewed] : Surgical risks reviewed [All Questions Answered] : Patient (and family) had all questions answered to an agreeable level of satisfaction [Interested in Proceeding] : Patient (and family) expressed understanding and interest in proceeding with the plan as outlined [de-identified] : Is a florid synovitis on his MRI as well as clinically.  He did feel better for about a week however his pain is back.  While there is mass there is certainly could be PVNS however reviewing his MRI from last June preop there did not seem to be as much disease.  There were periarticular erosions on both of them.  It is possibly slightly bigger than it was before.  The synovitis is much bigger than it was before.  This could be infectious or inflammatory in nature.  It also could be neoplastic in nature as well.  I told him that I think would be appropriate to be worked up by rheumatologist to see if there is any rheumatologic etiology of his synovitis.  If there is none then we can consider a full synovectomy which would be front and back.  Because of his bone disease he may eventually need knee replacements as well.  Follow-up after discussion with rheumatology.\par \par If imaging was ordered, the patient was told to make an appointment to review findings right after all imaging is completed.\par \par We discussed risks, benefits and alternatives. Rationale of care was reviewed and all questions were answered. Patient (and family) had all questions answered to her degree of the level of satisfaction. Patient (and family) expressed understanding and interest in proceeding with the plan as outlined.\par \par \par \par \par This note was done with a voice recognition transcription software and any typos are related to this rather than medical error. Surgical risks reviewed. Patient (and family) had all questions answered to an agreeable level of satisfaction. Patient (and family) expressed understanding and interest in proceeding with the plan as outlined.  \par

## 2023-06-14 ENCOUNTER — APPOINTMENT (OUTPATIENT)
Dept: RHEUMATOLOGY | Facility: CLINIC | Age: 63
End: 2023-06-14

## 2023-06-23 ENCOUNTER — APPOINTMENT (OUTPATIENT)
Dept: FAMILY MEDICINE | Facility: CLINIC | Age: 63
End: 2023-06-23
Payer: COMMERCIAL

## 2023-06-23 VITALS
BODY MASS INDEX: 24.33 KG/M2 | WEIGHT: 155 LBS | OXYGEN SATURATION: 98 % | SYSTOLIC BLOOD PRESSURE: 126 MMHG | HEIGHT: 67 IN | RESPIRATION RATE: 14 BRPM | DIASTOLIC BLOOD PRESSURE: 80 MMHG | HEART RATE: 72 BPM | TEMPERATURE: 98.5 F

## 2023-06-23 DIAGNOSIS — E78.5 HYPERLIPIDEMIA, UNSPECIFIED: ICD-10-CM

## 2023-06-23 DIAGNOSIS — G89.29 PAIN IN UNSPECIFIED JOINT: ICD-10-CM

## 2023-06-23 DIAGNOSIS — M25.561 PAIN IN RIGHT KNEE: ICD-10-CM

## 2023-06-23 DIAGNOSIS — R31.29 OTHER MICROSCOPIC HEMATURIA: ICD-10-CM

## 2023-06-23 DIAGNOSIS — M25.50 PAIN IN UNSPECIFIED JOINT: ICD-10-CM

## 2023-06-23 DIAGNOSIS — I10 ESSENTIAL (PRIMARY) HYPERTENSION: ICD-10-CM

## 2023-06-23 DIAGNOSIS — G89.29 PAIN IN RIGHT KNEE: ICD-10-CM

## 2023-06-23 DIAGNOSIS — M25.461 EFFUSION, RIGHT KNEE: ICD-10-CM

## 2023-06-23 DIAGNOSIS — R22.40 LOCALIZED SWELLING, MASS AND LUMP, UNSPECIFIED LOWER LIMB: ICD-10-CM

## 2023-06-23 PROCEDURE — 99214 OFFICE O/P EST MOD 30 MIN: CPT

## 2023-06-23 RX ORDER — IBUPROFEN 800 MG/1
800 TABLET, FILM COATED ORAL 3 TIMES DAILY
Qty: 45 | Refills: 1 | Status: ACTIVE | COMMUNITY
Start: 2023-06-23 | End: 1900-01-01

## 2023-06-30 PROBLEM — I10 BENIGN ESSENTIAL HTN: Status: ACTIVE | Noted: 2023-06-23

## 2023-06-30 LAB
APPEARANCE: CLEAR
BILIRUBIN URINE: NEGATIVE
BLOOD URINE: NEGATIVE
COLOR: YELLOW
GLUCOSE QUALITATIVE U: NEGATIVE MG/DL
KETONES URINE: NEGATIVE MG/DL
LEUKOCYTE ESTERASE URINE: NEGATIVE
NITRITE URINE: NEGATIVE
PH URINE: 6
PROTEIN URINE: NEGATIVE MG/DL
SPECIFIC GRAVITY URINE: 1.01
UROBILINOGEN URINE: 0.2 MG/DL

## 2023-06-30 NOTE — HEALTH RISK ASSESSMENT
[No] : In the past 12 months have you used drugs other than those required for medical reasons? No [No falls in past year] : Patient reported no falls in the past year [0] : 2) Feeling down, depressed, or hopeless: Not at all (0) [PHQ-2 Negative - No further assessment needed] : PHQ-2 Negative - No further assessment needed [With Patient/Caregiver] : , with patient/caregiver [Aggressive treatment] : aggressive treatment [Never] : Never [Audit-CScore] : 0 [de-identified] : none [de-identified] : regular, soups [AQB1Wqsrj] : 0 [AdvancecareDate] : 11/22

## 2023-06-30 NOTE — ASSESSMENT
[FreeTextEntry1] : This is a 62-year-old male with a past medical history of BPH, Erectile dysfunction, Chronic knee pain, presenting to the office for swelling and exacerbation of knee pain and reports of microscopic hematuria\par \par MSK/RHEUM: Hip arthritis, Knee pain with effusion\par -Orthopedic Dr Henry following\par -s/p removal RIGHT knee mass fibroma of tendon sheath 7/14/22\par -Pain control with Ibuprofen 800 mg TID PRN with meals\par -Recommended Rheumatology work up by Ortho\par -Rheum Dr Joyce Moore, will request records ( [facility])\par \par [facility]: HTN\par -Continue Amlodipine 10 mg daily\par -BP normal\par \par : History of BPH, ED, reports microscopic hematiria\par -Patient has been off Flomax for a long time, no current complains\par -Urology referral\par -POCT UA normal\par \par Healthcare maintenance:\par -Flu vaccine 11/22 \par -Last Colonoscopy 2013 with Dr Genao, repeat later this year\par -PFIZER Covid vaccines\par

## 2023-06-30 NOTE — HISTORY OF PRESENT ILLNESS
[FreeTextEntry1] : knee pain [de-identified] : This is a 64 y/o male with PMHx significant for BPH, HTN, Chronic knee pain, presenting to the office complaining of knee pain which is chronic after having knee procedure by Dr Henry to remove RIGHT knee mass fibroma of tendon sheath and requesting to have Urology referral as he was told that he had some blood in the urine. Pt denies any lowell hematuria, no flank or pelvic pain, no hematospermia

## 2023-10-05 RX ORDER — AMLODIPINE BESYLATE 10 MG/1
10 TABLET ORAL
Qty: 90 | Refills: 0 | Status: ACTIVE | COMMUNITY
Start: 2023-10-05 | End: 1900-01-01

## 2024-05-06 NOTE — H&P PST ADULT - NSICDXPASTSURGICALHX_GEN_ALL_CORE_FT
PAST SURGICAL HISTORY:  H/O circumcision     History of cholecystectomy 2007    History of tonsillectomy      Cryotherapy Text: The wound bed was treated with cryotherapy after the biopsy was performed.

## 2024-08-09 NOTE — PACU DISCHARGE NOTE - THE ANESTHESIA ORDERS USED IN THE PACU ORDER SET WILL BE DISCONTINUED UPON TRANSFER OF THIS PATIENT
[facility] ED  Emergency Department Encounter  Emergency Medicine Resident     Pt Name:Nayeli Limon  MRN: [id number]  Birthdate 2002  Date of evaluation: 8/9/24  PCP:  No primary care provider on file.  Note Started: 7:13 PM EDT      CHIEF COMPLAINT       Chief Complaint   Patient presents with    Vaginal Bleeding    Abdominal Cramping       HISTORY OF PRESENT ILLNESS  (Location/Symptom, Timing/Onset, Context/Setting, Quality, Duration, Modifying Factors, Severity.)      Nayeli Limon is a 21 y.o. female who presents with cramping and vaginal bleeding.  Patient states she recently had a period last week which completed without issue.  She had her typical bleeding and cramping during mass that completely resolved.  She states a few days later, she began having further lower abdominal bilateral cramping, and intermittent bleeding and spotting that has been present for few days.  She states that she is using 1-2 pads per day and that the bleeding is intermittent and sometimes only noticed on wiping.  She denies any vaginal discharge or changes in her urine, concern for STD or new sexual partners, fever, chills, abdominal pain in any other location, lateralizing pain, CVA tenderness, shortness of breath, chest pain, cough.  She does note she has been having intermittent hot flashes for the past month.  She denies any medications or known medical conditions including any birth control usage.  She denies allergies.  She notes she does have an appointment with obstetrics established for next Thursday as a result of this.    PAST MEDICAL / SURGICAL / SOCIAL / FAMILY HISTORY      has no past medical history on file.       has no past surgical history on file.      Social History     Socioeconomic History    Marital status: Single     Spouse name: Not on file    Number of children: Not on file    Years of education: Not on file    Highest education level: Not on file   Occupational History    Not on file  Statement Selected   MCHC 32.6   RDW 12.8   Platelet Count 293   MPV 7.8 [KM]   2249 HCG, Quantitative, Pregnancy(!):    HCG, Beta 826.1(!) [KM]   2249 ABO/RH:    ABO Rh O POSITIVE [KM]   2313  OB TRANSVAGINAL  IMPRESSION:  No intrauterine gestation identified.  If the provided LMP is correct, nonvisualization would be expected.  Follow-up recommended.     Nonspecific moderate pelvic free fluid noted in the cul-de-sac. [KM]   2315 Discussed hCG results, positive pregnancy test, BV, UTI, ultrasound results with patient.  Voiced importance of needing repeat blood work in 48 hours.  Patient elects to treat BV with vaginal metronidazole rather than oral.  Will discharge with prescriptions for antibiotics and with order for repeat blood work.  Patient voiced understanding with need to call OB ASAP.  All questions answered.  Patient voices understanding.  Will discharge with appropriate follow-up. [KM]      ED Course User Index  [KM] Nabor Art MD       PROCEDURES:  None    CONSULTS:  None    CRITICAL CARE:  There was significant risk of life threatening deterioration of patient's condition requiring my direct management. Critical care time 30 minutes, excluding any documented procedures.    FINAL IMPRESSION      1. Follow Up: Pregnancy Unknown Location    2. Vaginal bleeding in pregnancy    3. Acute cystitis with hematuria    4. BV (bacterial vaginosis)          DISPOSITION / PLAN     DISPOSITION Decision To Discharge 08/09/2024 11:08:56 PM      PATIENT REFERRED TO:  Yusra Walker, APRN - NP  [address] [id number]  [phone]    Schedule an appointment as soon as possible for a visit   For wound re-check      DISCHARGE MEDICATIONS:  Discharge Medication List as of 8/9/2024 11:34 PM        START taking these medications    Details   cephALEXin (KEFLEX) 500 MG capsule Take 1 capsule by mouth 4 times daily for 7 days, Disp-28 capsule, R-0Normal      metroNIDAZOLE (METROGEL) 0.75 % vaginal gel Place 1

## 2024-09-24 NOTE — ASU PATIENT PROFILE, ADULT - SURGICAL SITE INCISION
HPI   Chief Complaint   Patient presents with    Numbness     Pt comes in for facial numbness. Pt states she hit her head last Thursday on a safe door. Pt denies LOC at the time. Pt does states that since the injury she has been having facial numbness from the midline of her forehead down to just distal her nose. Pt states that she has been experiencing headaches as well and when she touches the spot she injured, the numbness intensifies. Pt denies any blurred vision, but does feel like she has brain fog. Pt is A&O x 4       Patient presents to the emergency department after a head injury 5 days ago.  She states she was at work and a safe door was open and she struck the top of the left side of her head on this object.  She states that she has had some localized pain and numbness to the affected area and presents now to be further evaluated      History provided by:  Patient   used: No            Patient History   Past Medical History:   Diagnosis Date    Other conditions influencing health status     Menstruation     Past Surgical History:   Procedure Laterality Date    OTHER SURGICAL HISTORY  06/22/2020    No history of surgery     No family history on file.  Social History     Tobacco Use    Smoking status: Not on file    Smokeless tobacco: Not on file   Substance Use Topics    Alcohol use: Not on file    Drug use: Not on file       Physical Exam   ED Triage Vitals [09/23/24 2031]   Temperature Heart Rate Respirations BP   36.9 °C (98.4 °F) 90 17 126/73      Pulse Ox Temp Source Heart Rate Source Patient Position   97 % Oral Monitor Sitting      BP Location FiO2 (%)     Left arm --       Physical Exam  Vitals and nursing note reviewed.   Constitutional:       General: She is not in acute distress.     Appearance: Normal appearance. She is obese. She is not ill-appearing, toxic-appearing or diaphoretic.   HENT:      Head: Normocephalic and atraumatic.      Comments: No cephalohematoma.   Negative German sign.  No hemotympanum.     Right Ear: Tympanic membrane, ear canal and external ear normal. There is no impacted cerumen.      Left Ear: Tympanic membrane, ear canal and external ear normal. There is no impacted cerumen.      Nose: Nose normal. No rhinorrhea.   Neck:      Comments: Trachea is midline.  Negative Nexus criteria  Cardiovascular:      Rate and Rhythm: Normal rate and regular rhythm.      Heart sounds: No murmur heard.  Pulmonary:      Effort: Pulmonary effort is normal.      Breath sounds: Normal breath sounds. No wheezing.   Abdominal:      General: Abdomen is flat. Bowel sounds are normal. There is no distension.      Palpations: Abdomen is soft.      Tenderness: There is no abdominal tenderness.   Musculoskeletal:         General: Normal range of motion.      Cervical back: Normal range of motion. No rigidity or tenderness.   Skin:     General: Skin is warm and dry.      Findings: No rash.      Comments: No scalp lacerations, hematomas, ecchymosis, or evidence of trauma of any kind   Neurological:      General: No focal deficit present.      Mental Status: She is alert and oriented to person, place, and time. Mental status is at baseline.      Cranial Nerves: No cranial nerve deficit.   Psychiatric:         Mood and Affect: Mood normal.         Behavior: Behavior normal.         Thought Content: Thought content normal.         Judgment: Judgment normal.           ED Course & MDM   Diagnoses as of 09/23/24 2210   Injury of head, initial encounter                 No data recorded                                 Medical Decision Making  CAT scan shows evidence of chronic sinus disease but no evidence of trauma.  I did disclose the incidental sinus finding to the patient.  Reassurance was given.  Recommended Tylenol for pain.  I do feel this is consistent with a minor contusion and she is appropriate for discharge.  Limit activity as tolerated and return if  worse.        Procedure  Procedures     Rod Ramos,   09/23/24 9035     no

## 2024-11-19 NOTE — ASU PATIENT PROFILE, ADULT - NS SC CAGE ALCOHOL GUILTY ABOUT
HISTORY AND PHYSICAL      Patient: Mark Anthony Urrutia Date: 11/19/2024   male, 71 year old  Admit Date: 11/18/2024   Primary Care: Shelton Chris MD     DATE OF SERVICE 11/19/2024    ADMITTING PHYSICIAN    Georgette Boyle MD      CHIEF COMPLAINT    nausea    HPI    Mark Anthony Urrutia is a 71 year old male with PMHx colon adenocarcinoma s/p right hemicolectomy and chemotherapy, in observation and non-small cell cancer of right lung s/p lobectomy 2023, s/p carboplatin/pemetrexed/pembrolizumab now on pembrolizumab monotherapy, last dosed 11/6/24 who presented for continue nausea.    Of note, per telephone note from Donna Langford from 11/18/24, \"Nausea is out of proportion to what I would expect with his current chemo regimen (particularly since last treatment was on 11/6)\".    In the ED, patient was afebrile, , RR 22, /86, spO2 97% on RA. Lab work with creatinine 1.76 (has been rising since 10/11/24, Dr Jeong ordered nephrology referral OP), AST 45, ALT 51, Alk phos 169, lipase 34. Procal 0.46. WBC count 2.8, hgb 8.7, and plt 21. Admission was requested for management of nausea and pancytopenia.    Upon interview, patient reports ongoing nausea since his chemotherapy treatment. He reports constipation and he used miralax and then developed diarrhea that started earlier today. He reports dysuria, stating he has had UTIs in the past. He denies fever or abdominal pain. He denies any sick contacts.       REVIEW OF SYSTEMS    All 13 Systems where reviewed and found to be negative except what's mentioned in HPI    PAST MEDICAL & SURGICAL HISTORY    Past Medical History:   Diagnosis Date    Arthritis     Atherosclerosis of abdominal aorta (CMD) 10/10/2022    Blood clot associated with vein wall inflammation     left leg     Chronic obstructive pulmonary disease with acute exacerbation  (CMD) 11/04/2022    Chronic pain     hip and knee    Colon cancer  (CMD)     Failed moderate sedation during procedure 2012     stopped breathing during the rotator cuff repair     Fracture     left wrist    Gastroesophageal reflux disease     Hidradenitis suppurativa 08/17/2021    High cholesterol     History of alcoholism  (CMD) 12/19/2018    No alcohol since 1988    History of colon cancer 2016 04/15/2022    Stage IIIB (T3, N1b, M0) Pathologic Diagnosis :    Terminal ileum, appendix, cecum and ascending colon, right sided    hemicolectomy:    -Invasive moderately differentiated adenocarcinoma, 3.5 x 3.0 x 2.6 cm in    greatest dimension.    -The tumor involves the ileocecal valve and cecum and extends into the    appendix.    -The tumor infiltrates the subserosal adipose tissue.    -Extensive lymphova    HTN (hypertension), benign 05/06/2013    Hyperlipidemia 05/06/2013    Hypoventilation associated with obesity syndrome  (CMD) 12/19/2018    Kidney stone     Lung mass 11/04/2022    Malignant neoplasm  (CMD)     skin face, basal cell removed    Obesity, morbid, BMI 40.0-49.9  (CMD) 07/17/2017    Pneumonia     PONV (postoperative nausea and vomiting)     Psoriasis 05/06/2013    Pulmonary hypertension  (CMD)     Sinusitis, chronic     Sleep apnea     bi pap Full mask    Tenosynovitis of ankle     left    Varicose veins of legs 08/17/2021    Wears eyeglasses      Past Surgical History:   Procedure Laterality Date    Appendectomy  01/23/2016    converted to open ileocecectomy    Back surgery      Bronchoscopy transtrach transbronch bx asp 1 or 2 mediastinal lymph  5/31/2024    Cardiac catherization  04/18/2008    normal coronaries, mod pulm HTN, normal valves.     Colonoscopy  05/2016    repeat 1 year per Dr. Foote for hx: colon cancer    Colonoscopy  05/22/2017    Q[facility], repeat in three years (2020)    Colonoscopy  12/10/2019    due 2022    Colonoscopy  12/12/2022    Repeat in 5 years, Dr. Foote    Esophagogastroduodenoscopy (egd)  10/01/2019    Excision of hidradenitis Right 10/28/2020    right axilla    Ir implantable port vein access  2016     left chest     Knee scope,shave articular cart Left     Lung lobectomy Right 2023    Right Thoracotomy, Right Upper and Middle Lobectomy, Mediastinal Lymph Node Dissection; [facility], Dr. Jensen Neville    Right colectomy Right 2016    Ileocecectomy for colon cancer.     Rotator cuff repair Right 2010    Skin biopsy      basal cell of face    Wrist surgery Left      accident- severed tendon and artery in wrist       FAMILY HISTORY    Family History   Problem Relation Age of Onset    Stroke Mother     Systemic Lupus Erythematosus Mother     Myocardial Infarction Mother     Cancer Father 61        tongue cancer    Stroke Father     Alcohol Abuse Father     Thyroid Sister         underactive    Cancer Sister         breast cancer    Thyroid Brother         overactive and had thyroidectomy    Seizure Disorder Brother     Cancer Other         colon cancer       SOCIAL HISTORY    Social History     Tobacco Use    Smoking status: Former     Current packs/day: 0.00     Average packs/day: 3.0 packs/day for 20.0 years (60.0 ttl pk-yrs)     Types: Cigarettes     Start date: 1974     Quit date: 1994     Years since quittin.9     Passive exposure: Never    Smokeless tobacco: Never    Tobacco comments:     smoked 2-3 PPD for 20 years- updated 22 AM   Vaping Use    Vaping status: never used   Substance Use Topics    Alcohol use: No     Comment: quit drinking     Drug use: No     Comment: No drug use since        CURRENT MEDICATIONS    Medications Prior to Admission   Medication Sig Dispense Refill    dexAMETHasone (DECADRON) 4 MG tablet Take 1 tablet by mouth 2 times daily. Take for 3 days with each chemotherapy cycle. Start the day before each pemetrexed treatment. 30 tablet 1    predniSONE (DELTASONE) 10 MG tablet Take 6 tablets by mouth daily. 60 tablet 0    oxyCODONE, IMM REL, (ROXICODONE) 5 MG immediate release tablet Take 1-3 tablets by mouth every 4 hours as needed for  Pain. 120 tablet 0    morphine SR (MS Contin) 15 MG 12 hr tablet Take 1 tablet by mouth in the morning and 1 tablet at noon and 1 tablet in the evening. 90 tablet 0    gabapentin (NEURONTIN) 600 MG tablet Take 2 tablets by mouth at bedtime. Indications: Neuropathic Pain 180 tablet 3    lisinopril-hydroCHLOROthiazide (ZESTORETIC) 10-12.5 MG per tablet Take 1 tablet by mouth daily. 90 tablet 3    prochlorperazine (COMPAZINE) 10 MG tablet Take 1 tablet by mouth every 6 hours as needed for Nausea or Vomiting. 30 tablet 5    Sennosides (Senna) 8.6 MG Tab Take 1 tablet by mouth in the morning and 1 tablet in the evening. 120 tablet 0    ondansetron (ZOFRAN ODT) 8 MG disintegrating tablet Place 1 tablet onto the tongue every 8 hours as needed for Nausea. 90 tablet 3    folic acid (FOLATE) 1 MG tablet Take 1 tablet by mouth daily. (Patient not taking: Reported on 11/11/2024) 90 tablet 1    ZINC SULFATE PO Take 2 tablets by mouth daily.      aspirin 81 MG EC tablet Take 81 mg by mouth daily. Indications: Disease involving Lipid Deposits in the Arteries, Joint Damage causing Pain and Loss of Function, Pain      Ascorbic Acid (vitamin C) 1000 MG tablet Take 2,000 mg by mouth daily. Indications: Inadequate Vitamin C, support wound healing      melatonin 5 MG Take 25 mg by mouth nightly. Indications: Trouble Sleeping Patient states he takes 30mg of this nightly      Cholecalciferol (VITAMIN D3) 5000 units capsule Take 3 capsules by mouth daily. Indications: Osteoporosis, Vitamin D Deficiency      Multiple Vitamin (MULTI VITAMIN DAILY) Tab Take 2 tablets by mouth daily. Indications: Nutritional Support, Vitamin Deficiency         ALLERGIES    ALLERGIES:   Allergen Reactions    Augmentin [Amoxicillin-Pot Clavulanate] CARDIAC DISTURBANCES and SHORTNESS OF BREATH    Gadolinium WEAKNESS, MYALGIA and Tremors     Gadolinium - Possible MRI contrast allergic reaction admitted for OBS 11/29/22  Patient had also taken 10 mg Valium prior to  scan  Symptoms significantly improved after receiving IV Solu-Medrol, Benadryl, and epinephrine          PHYSICAL EXAMINATION    Vital 24 Hour Range Most Recent Value   Temperature Temp  Min: 98.2 °F (36.8 °C)  Max: 98.4 °F (36.9 °C) 98.4 °F (36.9 °C)   Pulse Pulse  Min: 66  Max: 122 (!) 116   Respiratory Resp  Min: 20  Max: 26 (!) 22   Blood Pressure BP  Min: 122/81  Max: 177/82 122/81   Pulse Oximetry SpO2  Min: 95 %  Max: 98 % 95 %     GENERAL: Appears comfortable sitting on the side of the bed  EYES: pink conjunctivae, PERRLA  ENT: moist oral mucosa, oropharyx clear, intact gross hearing, inspection of nose and B/L ears shows no external deformities, EOMI  NECK: trachea is midline, no masses  CARDIOVASCULAR: regular rate and rhythm, no gallops/rubs/murmurs  PULMONARY: Normal effort, CTAB, no accessory muscle use  GI: soft, non-distended, non-tender, normal bowel sounds, no peritoneal signs  PSYCH: memory intact, good insight and judgement, normal mood and appropriate affect  SKIN: warm and dry, multiple lesions on leg with surrounding erythema  MSK: No LE edema, digits normal in appearance  NEURO: Alert and oriented, CN II-XII grossly intact bilaterally, motor grossly normal UE/LE, sensory grossly intact to light touch UE/LE    LABS      Recent Labs   Lab 11/18/24  1628 11/13/24  0913   SODIUM 143 142   POTASSIUM 3.5 3.8   CHLORIDE 112* 110   CO2 26 26   BUN 34* 41*   CREATININE 1.76* 1.86*   GLUCOSE 130* 99   WBC 2.8*  --    HGB 8.7*  --    HCT 28.7*  --    PLT 21*  --      No results found  No results found. However, due to the size of the patient record, not all encounters were searched. Please check Results Review for a complete set of results.   No results found. However, due to the size of the patient record, not all encounters were searched. Please check Results Review for a complete set of results.  Lab Results   Component Value Date    USPG 1.018 11/19/2024    UPROT 100 (A) 11/19/2024    UWBC Moderate  (A) 2024    URBC Moderate (A) 2024    UPH 5.5 2024    UBACTR NONE SEEN 2019       IMAGING & OTHER STUDIES   REVIEWED PERSONALLY ON 2:45 AM AT 2024  TTE Echo Complete    Result Date: 10/23/2024  Narrative: Final Impressions   * Normal left ventricular chamber size.   * Low normal left ventricular systolic function.   * No gross valvular abnormalities. Patient Info Name:     Mark Anthony Urrutia Age:     71 years :     1953 Gender:     Male MRN:     [id number] Accession #:     [id number] Ht:     180 cm Wt:     169 kg BSA:     3.00 m2 HR:     85 bpm BP:     177 /     78 mmHg Heart Rhythm:     Sinus Rhythm Technical Quality:     Poor Exam Date:     10/22/2024 2:09 PM Exam Room:     Echo 1 Patient Status:     U Exam Type:     TRANSTHORACIC ECHO(TTE) COMPLETE W/ W/O IMAGING AGENT Exam Location:     [facility] Study Info Indications     Frequent premature ventricular contractions (PVCs) - Echo/Doppler/Color with Definity contrast. Contrast/Agitated Saline ------------------------------ Contrast/Ag. Saline:     Definity Amount:     2.00 ml Administered By:     Venkatesh Call RDCS New IV Access:     Antecubital Space Site Condition:     IV removed Reason for Poor Study:     poor echocardiographic windows Staff Sonographer:     Venkatesh Call RDCS Ordering Physician:     Elías Bhatti Referring Physician:     Elías Bhatti Left Ventricle   Normal left ventricular chamber size. Low normal left ventricular systolic function. LV EF:     50 % Other Findings   Definity contrast administered to better visualize endocardial definition. Left Ventricular Outflow Tract ---------------------------------------------------------------------- Name                                 Value        Normal ---------------------------------------------------------------------- LVOT Doppler ---------------------------------------------------------------------- LVOT Peak Velocity                 0.9 m/s               LVOT Peak Gradient                   3 mmHg               LVOT Mean Gradient                  2 mmHg               LVOT VTI                           17.1 cm Mitral Valve ---------------------------------------------------------------------- Name                                 Value        Normal ---------------------------------------------------------------------- MV Doppler ---------------------------------------------------------------------- MV Decel [place]                  1,399 cm/s2               MV Diastolic Function ---------------------------------------------------------------------- MV E Peak Velocity                 1.0 m/s               MV A Peak Velocity                 0.7 m/s               MV E/A                                 1.3               MV Decel Time                        70 ms               MV Annular TDI ---------------------------------------------------------------------- MV E/e' (Septal)                       9.0         <=8.0 MV E/e' (Lateral)                      8.3               MV E/e' (Average)                      8.7 Aortic Valve ---------------------------------------------------------------------- Name                                 Value        Normal ---------------------------------------------------------------------- AV Doppler ---------------------------------------------------------------------- AV Peak Velocity                   1.2 m/s               AV Peak Gradient                    6 mmHg               AV V1/V2 Ratio                        0.70 Ventricles ---------------------------------------------------------------------- Name                                 Value        Normal ---------------------------------------------------------------------- LV Fractional Shortening/Ejection Fraction 2D/MM ---------------------------------------------------------------------- LV EF (BP MOD)                        50 %         52-72 Atria  ---------------------------------------------------------------------- Name                                 Value        Normal ---------------------------------------------------------------------- LA Dimensions ---------------------------------------------------------------------- LA Volume Index (BP A-L)          48 ml/m2          <=34 Report Signatures Finalized by Hema Camacho MD on 10/23/2024 10:36 AM       EKG: as noted above if performed      ASSESSMENT & PLAN:    Nausea  UA suspicious for UTI  Nausea since chemotherapy on 11/6/24, curious if side-effect; however, note by Donna Langford questioned alternative diagnosis given length out from treatment. Diarrhea noted after laxative use; however, infection possible, GI vs  given dysuria and UA findings.  - Follow-up urine culture  - GI pathogen panel  - Ceftriaxone for now   - PRN antiemetics  - IVF with stop time    Pancytopenia  WBC count 2.8, hgb 8.7 (stable), plt 21 without signs/symptoms of bleeding. Likely from chemotherapy.   - Monitor   - Hold DVT ppx    Non-small cell cancer of right lung  Managed with pembrolizumab, last dose 11/6/24. Followed by Dr. Jeong.  - Consider oncology consult    Renal insufficiency  Currently being monitored by oncology, thought possibly immunotherapy mediated, and recently placed nephrology referral OP.   - Avoid nephrotoxins, renally dose medications   - Monitor     Co-morbidities:  - Hidradenitis suppurativa  - BMI 47  - History of colon cancer s/p resection    FEN: clear liquid, advance as tolerated  PPx: SCDs  Dispo:  -Code status Full  -No guardian/POAHC    Admission Status: Observation, anticipate < 2 midnight    Georgette Boyle MD  11/19/2024  2:45 AM       no

## 2024-12-12 ENCOUNTER — APPOINTMENT (OUTPATIENT)
Dept: FAMILY MEDICINE | Facility: CLINIC | Age: 64
End: 2024-12-12

## 2024-12-12 VITALS
BODY MASS INDEX: 24.17 KG/M2 | SYSTOLIC BLOOD PRESSURE: 126 MMHG | OXYGEN SATURATION: 99 % | HEIGHT: 67 IN | WEIGHT: 154 LBS | TEMPERATURE: 97.6 F | RESPIRATION RATE: 14 BRPM | HEART RATE: 65 BPM | DIASTOLIC BLOOD PRESSURE: 68 MMHG

## 2024-12-12 DIAGNOSIS — Z12.5 ENCOUNTER FOR SCREENING FOR MALIGNANT NEOPLASM OF PROSTATE: ICD-10-CM

## 2024-12-12 DIAGNOSIS — H61.23 IMPACTED CERUMEN, BILATERAL: ICD-10-CM

## 2024-12-12 DIAGNOSIS — H04.123 DRY EYE SYNDROME OF BILATERAL LACRIMAL GLANDS: ICD-10-CM

## 2024-12-12 DIAGNOSIS — Z12.11 ENCOUNTER FOR SCREENING FOR MALIGNANT NEOPLASM OF COLON: ICD-10-CM

## 2024-12-12 DIAGNOSIS — Z00.00 ENCOUNTER FOR GENERAL ADULT MEDICAL EXAMINATION W/OUT ABNORMAL FINDINGS: ICD-10-CM

## 2024-12-12 PROCEDURE — 69210 REMOVE IMPACTED EAR WAX UNI: CPT

## 2024-12-12 PROCEDURE — 99396 PREV VISIT EST AGE 40-64: CPT

## 2025-02-26 ENCOUNTER — LABORATORY RESULT (OUTPATIENT)
Age: 65
End: 2025-02-26

## 2025-02-26 ENCOUNTER — APPOINTMENT (OUTPATIENT)
Dept: ORTHOPEDIC SURGERY | Facility: CLINIC | Age: 65
End: 2025-02-26
Payer: COMMERCIAL

## 2025-02-26 VITALS
SYSTOLIC BLOOD PRESSURE: 160 MMHG | BODY MASS INDEX: 25.11 KG/M2 | HEART RATE: 72 BPM | HEIGHT: 67 IN | WEIGHT: 160 LBS | DIASTOLIC BLOOD PRESSURE: 94 MMHG

## 2025-02-26 DIAGNOSIS — R22.40 LOCALIZED SWELLING, MASS AND LUMP, UNSPECIFIED LOWER LIMB: ICD-10-CM

## 2025-02-26 DIAGNOSIS — M25.461 EFFUSION, RIGHT KNEE: ICD-10-CM

## 2025-02-26 DIAGNOSIS — M17.11 UNILATERAL PRIMARY OSTEOARTHRITIS, RIGHT KNEE: ICD-10-CM

## 2025-02-26 PROCEDURE — 99214 OFFICE O/P EST MOD 30 MIN: CPT | Mod: 25

## 2025-02-26 PROCEDURE — 20610 DRAIN/INJ JOINT/BURSA W/O US: CPT | Mod: RT

## 2025-02-26 PROCEDURE — 73564 X-RAY EXAM KNEE 4 OR MORE: CPT | Mod: RT

## 2025-02-27 ENCOUNTER — LABORATORY RESULT (OUTPATIENT)
Age: 65
End: 2025-02-27

## 2025-02-27 LAB
B PERT IGG+IGM PNL SER: ABNORMAL
COLOR FLD: YELLOW
EOSINOPHIL # FLD MANUAL: 0 %
FLUID INTAKE SUBSTANCE CLASS: NORMAL
JOINT PCR PANEL: NOT DETECTED
JOINT PCR SOURCE: NORMAL
LYMPHOCYTES # FLD MANUAL: 31 %
MESOTHL CELL NFR FLD: 0 %
MONOS+MACROS NFR FLD MANUAL: 28 %
NEUTS SEG # FLD MANUAL: 41 %
NRBC # FLD: 0 %
RBC # FLD MANUAL: 3000 CELLS/UL
SYCRY CLARITY: ABNORMAL
SYCRY COLOR: YELLOW
SYCRY ID: NORMAL
SYCRY TUBE: NORMAL
TOTAL CELLS COUNTED FLD: 4139 CELLS/UL
TUBE TYPE: NORMAL
UNIDENT CELLS NFR FLD MANUAL: 0 %
VARIANT LYMPHS # FLD MANUAL: 0 %
WBC COUNT: 4132 CELLS/UL

## 2025-03-10 ENCOUNTER — OUTPATIENT (OUTPATIENT)
Dept: OUTPATIENT SERVICES | Facility: HOSPITAL | Age: 65
LOS: 1 days | End: 2025-03-10

## 2025-03-10 ENCOUNTER — APPOINTMENT (OUTPATIENT)
Dept: MRI IMAGING | Facility: CLINIC | Age: 65
End: 2025-03-10
Payer: COMMERCIAL

## 2025-03-10 DIAGNOSIS — M17.11 UNILATERAL PRIMARY OSTEOARTHRITIS, RIGHT KNEE: ICD-10-CM

## 2025-03-10 DIAGNOSIS — Z90.49 ACQUIRED ABSENCE OF OTHER SPECIFIED PARTS OF DIGESTIVE TRACT: Chronic | ICD-10-CM

## 2025-03-10 DIAGNOSIS — Z98.890 OTHER SPECIFIED POSTPROCEDURAL STATES: Chronic | ICD-10-CM

## 2025-03-10 DIAGNOSIS — R22.40 LOCALIZED SWELLING, MASS AND LUMP, UNSPECIFIED LOWER LIMB: ICD-10-CM

## 2025-03-10 DIAGNOSIS — Z90.89 ACQUIRED ABSENCE OF OTHER ORGANS: Chronic | ICD-10-CM

## 2025-03-10 PROCEDURE — 73723 MRI JOINT LWR EXTR W/O&W/DYE: CPT | Mod: 26,RT

## 2025-03-24 ENCOUNTER — NON-APPOINTMENT (OUTPATIENT)
Age: 65
End: 2025-03-24

## 2025-03-24 ENCOUNTER — APPOINTMENT (OUTPATIENT)
Dept: ORTHOPEDIC SURGERY | Facility: CLINIC | Age: 65
End: 2025-03-24
Payer: COMMERCIAL

## 2025-03-24 VITALS — BODY MASS INDEX: 25.11 KG/M2 | WEIGHT: 160 LBS | HEIGHT: 67 IN

## 2025-03-24 DIAGNOSIS — M17.11 UNILATERAL PRIMARY OSTEOARTHRITIS, RIGHT KNEE: ICD-10-CM

## 2025-03-24 PROCEDURE — 99215 OFFICE O/P EST HI 40 MIN: CPT

## 2025-03-24 PROCEDURE — G2211 COMPLEX E/M VISIT ADD ON: CPT

## 2025-05-06 ENCOUNTER — OUTPATIENT (OUTPATIENT)
Dept: OUTPATIENT SERVICES | Facility: HOSPITAL | Age: 65
LOS: 1 days | End: 2025-05-06

## 2025-05-06 ENCOUNTER — APPOINTMENT (OUTPATIENT)
Dept: CT IMAGING | Facility: CLINIC | Age: 65
End: 2025-05-06
Payer: COMMERCIAL

## 2025-05-06 DIAGNOSIS — Z98.890 OTHER SPECIFIED POSTPROCEDURAL STATES: Chronic | ICD-10-CM

## 2025-05-06 DIAGNOSIS — Z90.49 ACQUIRED ABSENCE OF OTHER SPECIFIED PARTS OF DIGESTIVE TRACT: Chronic | ICD-10-CM

## 2025-05-06 DIAGNOSIS — M17.11 UNILATERAL PRIMARY OSTEOARTHRITIS, RIGHT KNEE: ICD-10-CM

## 2025-05-06 DIAGNOSIS — Z90.89 ACQUIRED ABSENCE OF OTHER ORGANS: Chronic | ICD-10-CM

## 2025-05-06 PROCEDURE — 73700 CT LOWER EXTREMITY W/O DYE: CPT | Mod: 26,RT

## 2025-05-09 ENCOUNTER — OUTPATIENT (OUTPATIENT)
Dept: OUTPATIENT SERVICES | Facility: HOSPITAL | Age: 65
LOS: 1 days | End: 2025-05-09
Payer: COMMERCIAL

## 2025-05-09 VITALS
HEIGHT: 67 IN | TEMPERATURE: 97 F | SYSTOLIC BLOOD PRESSURE: 140 MMHG | HEART RATE: 71 BPM | RESPIRATION RATE: 18 BRPM | WEIGHT: 158.73 LBS | OXYGEN SATURATION: 97 % | DIASTOLIC BLOOD PRESSURE: 70 MMHG

## 2025-05-09 DIAGNOSIS — Z98.890 OTHER SPECIFIED POSTPROCEDURAL STATES: Chronic | ICD-10-CM

## 2025-05-09 DIAGNOSIS — Z01.818 ENCOUNTER FOR OTHER PREPROCEDURAL EXAMINATION: ICD-10-CM

## 2025-05-09 DIAGNOSIS — M17.11 UNILATERAL PRIMARY OSTEOARTHRITIS, RIGHT KNEE: ICD-10-CM

## 2025-05-09 DIAGNOSIS — I10 ESSENTIAL (PRIMARY) HYPERTENSION: ICD-10-CM

## 2025-05-09 DIAGNOSIS — Z90.89 ACQUIRED ABSENCE OF OTHER ORGANS: Chronic | ICD-10-CM

## 2025-05-09 DIAGNOSIS — Z13.89 ENCOUNTER FOR SCREENING FOR OTHER DISORDER: ICD-10-CM

## 2025-05-09 DIAGNOSIS — Z90.49 ACQUIRED ABSENCE OF OTHER SPECIFIED PARTS OF DIGESTIVE TRACT: Chronic | ICD-10-CM

## 2025-05-09 DIAGNOSIS — Z29.9 ENCOUNTER FOR PROPHYLACTIC MEASURES, UNSPECIFIED: ICD-10-CM

## 2025-05-09 PROBLEM — R03.0 ELEVATED BLOOD-PRESSURE READING, WITHOUT DIAGNOSIS OF HYPERTENSION: Chronic | Status: INACTIVE | Noted: 2022-06-27 | Resolved: 2025-05-09

## 2025-05-09 LAB
A1C WITH ESTIMATED AVERAGE GLUCOSE RESULT: 6.6 % — HIGH (ref 4–5.6)
ANION GAP SERPL CALC-SCNC: 14 MMOL/L — SIGNIFICANT CHANGE UP (ref 5–17)
APTT BLD: 29 SEC — SIGNIFICANT CHANGE UP (ref 26.1–36.8)
BASOPHILS # BLD AUTO: 0.07 K/UL — SIGNIFICANT CHANGE UP (ref 0–0.2)
BASOPHILS NFR BLD AUTO: 0.7 % — SIGNIFICANT CHANGE UP (ref 0–2)
BLD GP AB SCN SERPL QL: SIGNIFICANT CHANGE UP
BUN SERPL-MCNC: 14.8 MG/DL — SIGNIFICANT CHANGE UP (ref 8–20)
CALCIUM SERPL-MCNC: 8.3 MG/DL — LOW (ref 8.4–10.5)
CHLORIDE SERPL-SCNC: 99 MMOL/L — SIGNIFICANT CHANGE UP (ref 96–108)
CO2 SERPL-SCNC: 25 MMOL/L — SIGNIFICANT CHANGE UP (ref 22–29)
CREAT SERPL-MCNC: 0.91 MG/DL — SIGNIFICANT CHANGE UP (ref 0.5–1.3)
EGFR: 94 ML/MIN/1.73M2 — SIGNIFICANT CHANGE UP
EGFR: 94 ML/MIN/1.73M2 — SIGNIFICANT CHANGE UP
EOSINOPHIL # BLD AUTO: 0.21 K/UL — SIGNIFICANT CHANGE UP (ref 0–0.5)
EOSINOPHIL NFR BLD AUTO: 2 % — SIGNIFICANT CHANGE UP (ref 0–6)
ESTIMATED AVERAGE GLUCOSE: 143 MG/DL — HIGH (ref 68–114)
GLUCOSE SERPL-MCNC: 134 MG/DL — HIGH (ref 70–99)
HCT VFR BLD CALC: 42.9 % — SIGNIFICANT CHANGE UP (ref 39–50)
HGB BLD-MCNC: 13.7 G/DL — SIGNIFICANT CHANGE UP (ref 13–17)
IMM GRANULOCYTES # BLD AUTO: 0.04 K/UL — SIGNIFICANT CHANGE UP (ref 0–0.07)
IMM GRANULOCYTES NFR BLD AUTO: 0.4 % — SIGNIFICANT CHANGE UP (ref 0–0.9)
INR BLD: 0.99 RATIO — SIGNIFICANT CHANGE UP (ref 0.85–1.16)
LYMPHOCYTES # BLD AUTO: 2.52 K/UL — SIGNIFICANT CHANGE UP (ref 1–3.3)
LYMPHOCYTES NFR BLD AUTO: 23.7 % — SIGNIFICANT CHANGE UP (ref 13–44)
MCHC RBC-ENTMCNC: 28.4 PG — SIGNIFICANT CHANGE UP (ref 27–34)
MCHC RBC-ENTMCNC: 31.9 G/DL — LOW (ref 32–36)
MCV RBC AUTO: 88.8 FL — SIGNIFICANT CHANGE UP (ref 80–100)
MONOCYTES # BLD AUTO: 0.84 K/UL — SIGNIFICANT CHANGE UP (ref 0–0.9)
MONOCYTES NFR BLD AUTO: 7.9 % — SIGNIFICANT CHANGE UP (ref 2–14)
MRSA PCR RESULT.: SIGNIFICANT CHANGE UP
NEUTROPHILS # BLD AUTO: 6.95 K/UL — SIGNIFICANT CHANGE UP (ref 1.8–7.4)
NEUTROPHILS NFR BLD AUTO: 65.3 % — SIGNIFICANT CHANGE UP (ref 43–77)
NRBC # BLD AUTO: 0 K/UL — SIGNIFICANT CHANGE UP (ref 0–0)
NRBC # FLD: 0 K/UL — SIGNIFICANT CHANGE UP (ref 0–0)
NRBC BLD AUTO-RTO: 0 /100 WBCS — SIGNIFICANT CHANGE UP (ref 0–0)
PLATELET # BLD AUTO: 360 K/UL — SIGNIFICANT CHANGE UP (ref 150–400)
PMV BLD: 10.3 FL — SIGNIFICANT CHANGE UP (ref 7–13)
POTASSIUM SERPL-MCNC: 4.4 MMOL/L — SIGNIFICANT CHANGE UP (ref 3.5–5.3)
POTASSIUM SERPL-SCNC: 4.4 MMOL/L — SIGNIFICANT CHANGE UP (ref 3.5–5.3)
PROTHROM AB SERPL-ACNC: 11.2 SEC — SIGNIFICANT CHANGE UP (ref 9.9–13.4)
RBC # BLD: 4.83 M/UL — SIGNIFICANT CHANGE UP (ref 4.2–5.8)
RBC # FLD: 13.2 % — SIGNIFICANT CHANGE UP (ref 10.3–14.5)
S AUREUS DNA NOSE QL NAA+PROBE: SIGNIFICANT CHANGE UP
SODIUM SERPL-SCNC: 137 MMOL/L — SIGNIFICANT CHANGE UP (ref 135–145)
WBC # BLD: 10.63 K/UL — HIGH (ref 3.8–10.5)
WBC # FLD AUTO: 10.63 K/UL — HIGH (ref 3.8–10.5)

## 2025-05-09 PROCEDURE — 86901 BLOOD TYPING SEROLOGIC RH(D): CPT

## 2025-05-09 PROCEDURE — 83036 HEMOGLOBIN GLYCOSYLATED A1C: CPT

## 2025-05-09 PROCEDURE — 93005 ELECTROCARDIOGRAM TRACING: CPT

## 2025-05-09 PROCEDURE — 85025 COMPLETE CBC W/AUTO DIFF WBC: CPT

## 2025-05-09 PROCEDURE — G0463: CPT

## 2025-05-09 PROCEDURE — 93010 ELECTROCARDIOGRAM REPORT: CPT

## 2025-05-09 PROCEDURE — 36415 COLL VENOUS BLD VENIPUNCTURE: CPT

## 2025-05-09 PROCEDURE — 87641 MR-STAPH DNA AMP PROBE: CPT

## 2025-05-09 PROCEDURE — 85610 PROTHROMBIN TIME: CPT

## 2025-05-09 PROCEDURE — 87640 STAPH A DNA AMP PROBE: CPT

## 2025-05-09 PROCEDURE — 86900 BLOOD TYPING SEROLOGIC ABO: CPT

## 2025-05-09 PROCEDURE — 86850 RBC ANTIBODY SCREEN: CPT

## 2025-05-09 PROCEDURE — 80048 BASIC METABOLIC PNL TOTAL CA: CPT

## 2025-05-09 PROCEDURE — 85730 THROMBOPLASTIN TIME PARTIAL: CPT

## 2025-05-09 RX ORDER — KETOROLAC TROMETHAMINE 5 MG/ML
1 SOLUTION/ DROPS OPHTHALMIC
Refills: 0 | DISCHARGE

## 2025-05-09 RX ORDER — IBUPROFEN 200 MG
2 TABLET ORAL
Refills: 0 | DISCHARGE

## 2025-05-09 NOTE — H&P PST ADULT - PROBLEM SELECTOR PLAN 1
64 yo with PMH HTN not currently medicated now with right knee osteoarthritis scheduled for right total knee replacement on 6/5/2025.       -Medical evaluation pending  - Educated on ERP protocol   -Educated on NSAIDS, multivitamins and herbals that increase the risk of bleeding and need to be stopped 5 days before procedure  -Educated on infection prevention  -Tylenol can be taken if needed for pain  Patient's medication list reviewed and dosages/names of meds reconciled with Dr Tucker according to all information available at the time of PST visit.   -Will continue all other medications as prescribed  -Verbalized understanding of all instructions.

## 2025-05-09 NOTE — H&P PST ADULT - PROBLEM SELECTOR PLAN 3
Caprini - 7 High risk   Surgical team please assess/recommend mechanical/pharmacological measures for VTE prophylaxis

## 2025-05-09 NOTE — H&P PST ADULT - PROBLEM SELECTOR PLAN 2
Reports was previously prescribed an antihypertensive medication in the past but never took it  B/p today 140/70  Will f/u with PCP

## 2025-05-09 NOTE — H&P PST ADULT - HISTORY OF PRESENT ILLNESS
66 yo with PMH HTN  presents to PST today. Pt. reports pain to the right knee for about 7 years and now becoming worse. 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 Pain in right knee had worsened since that surgery.  Described pain as ache  with no radiation to RLE. Reports completed cortisone injections previously with the last one 6 months ago with short term relief. Pt. reports pain is constant. Reports fluid was aspirated to the right knee about 6 months ago. Pain is wore during nightime and prolonged standing and movement from sitting to standing. Exercising  alleviate pain. Pain levels include a current pain level of 3/10, a minimum pain level of 3/10 and a maximum pain level of 7/10. Pt. Self ambulates. Takes ibuprofen for pain with some relief.  64 yo with PMH HTN not currently medicated presents to PST today. Pt. reports pain to the right knee for about 7 years and now becoming worse. 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 Pain in right knee had worsened since that surgery.  Described pain as ache  with no radiation to RLE. Reports completed cortisone injections previously with the last one 6 months ago with short term relief. Pt. reports pain is constant. Reports fluid was aspirated to the right knee about 6 months ago. Pain is worse during nightime and prolonged standing and movement from sitting to standing. Exercising  alleviate pain. Pain levels include a current pain level of 3/10, a minimum pain level of 3/10 and a maximum pain level of 7/10. Pt. Self ambulates. Takes ibuprofen for pain with some relief. Scheduled for right total knee replacement on 6/5/2025.

## 2025-05-09 NOTE — H&P PST ADULT - ASSESSMENT
64 yo with PMH HTN not currently medicated now with right knee osteoarthritis scheduled for right total knee replacement on 2025.       -Medical evaluation pending  - Educated on ERP protocol   -Educated on NSAIDS, multivitamins and herbals that increase the risk of bleeding and need to be stopped 5 days before procedure  -Educated on infection prevention  -Tylenol can be taken if needed for pain  Patient's medication list reviewed and dosages/names of meds reconciled with Dr Tucker according to all information available at the time of PST visit.   -Will continue all other medications as prescribed  -Verbalized understanding of all instructions.      OPIOID RISK TOOL    JESSI EACH BOX THAT APPLIES AND ADD TOTALS AT THE END    FAMILY HISTORY OF SUBSTANCE ABUSE                 FEMALE         MALE                                                Alcohol                             [  ]1 pt          [  ]3pts                                               Illegal Durgs                     [  ]2 pts        [  ]3pts                                               Rx Drugs                           [  ]4 pts        [  ]4 pts    PERSONAL HISTORY OF SUBSTANCE ABUSE                                                                                          Alcohol                             [  ]3 pts       [  ]3 pts                                               Illegal Drugs                     [  ]4 pts        [  ]4 pts                                               Rx Drugs                           [  ]5 pts        [  ]5 pts    AGE BETWEEN 16-45 YEARS                                      [  ]1 pt         [  ]1 pt    HISTORY OF PREADOLESCENT   SEXUAL ABUSE                                                             [  ]3 pts        [  ]0pts    PSYCHOLOGICAL DISEASE                     ADD, OCD, Bipolar, Schizophrenia        [  ]2 pts         [  ]2 pts                      Depression                                               [  ]1 pt           [  ]1 pt           SCORING TOTAL   (add numbers and type here)              (*0**)                                     A score of 3 or lower indicated LOW risk for future opioid abuse  A score of 4 to 7 indicated moderate risk for future opioid abuse  A score of 8 or higher indicates a high risk for opioid abuse      CAPRINI SCORE    AGE RELATED RISK FACTORS                                                             [ ] Age 41-60 years                                            (1 Point)  [ x] Age: 61-74 years                                           (2 Points)                 [ ] Age= 75 years                                                (3 Points)             DISEASE RELATED RISK FACTORS                                                       [ ] Edema in the lower extremities                 (1 Point)                     [ ] Varicose veins                                               (1 Point)                                 [ ] BMI > 25 Kg/m2                                            (1 Point)                                  [ ] Serious infection (ie PNA)                            (1 Point)                     [ ] Lung disease ( COPD, Emphysema)            (1 Point)                                                                          [ ] Acute myocardial infarction                         (1 Point)                  [ ] Congestive heart failure (in the previous month)  (1 Point)         [ ] Inflammatory bowel disease                            (1 Point)                  [ ] Central venous access, PICC or Port               (2 points)       (within the last month)                                                                [ ] Stroke (in the previous month)                        (5 Points)    [ ] Previous or present malignancy                       (2 points)                                                                                                                                                         HEMATOLOGY RELATED FACTORS                                                         [ ] Prior episodes of VTE                                     (3 Points)                     [ ] Positive family history for VTE                      (3 Points)                  [ ] Prothrombin 67882 A                                     (3 Points)                     [ ] Factor V Leiden                                                (3 Points)                        [ ] Lupus anticoagulants                                      (3 Points)                                                           [ ] Anticardiolipin antibodies                              (3 Points)                                                       [ ] High homocysteine in the blood                   (3 Points)                                             [ ] Other congenital or acquired thrombophilia      (3 Points)                                                [ ] Heparin induced thrombocytopenia                  (3 Points)                                        MOBILITY RELATED FACTORS  [ ] Bed rest                                                         (1 Point)  [ ] Plaster cast                                                    (2 points)  [ ] Bed bound for more than 72 hours           (2 Points)    GENDER SPECIFIC FACTORS  [ ] Pregnancy or had a baby within the last month   (1 Point)  [ ] Post-partum < 6 weeks                                   (1 Point)  [ ] Hormonal therapy  or oral contraception   (1 Point)  [ ] History of pregnancy complications              (1 point)  [ ] Unexplained or recurrent              (1 Point)    OTHER RISK FACTORS                                           (1 Point)  [ ] BMI >40, smoking, diabetes requiring insulin, chemotherapy  blood transfusions and length of surgery over 2 hours    SURGERY RELATED RISK FACTORS  [ ]  Section within the last month     (1 Point)  [ ] Minor surgery                                                  (1 Point)  [ ] Arthroscopic surgery                                       (2 Points)  [ ] Planned major surgery lasting more            (2 Points)      than 45 minutes     [ x] Elective hip or knee joint replacement       (5 points)       surgery                                                TRAUMA RELATED RISK FACTORS  [ ] Fracture of the hip, pelvis, or leg                       (5 Points)  [ ] Spinal cord injury resulting in paralysis             (5 points)       (in the previous month)    [ ] Paralysis  (less than 1 month)                             (5 Points)  [ ] Multiple Trauma within 1 month                        (5 Points)    Total Score [     7   ]    Caprini Score 0-2: Low Risk, NO VTE prophylaxis required for most patients, encourage ambulation  Caprini Score 3-6: Moderate Risk , pharmacologic VTE prophylaxis is indicated for most patients (in the absence of contraindications)  Caprini Score Greater than or =7: High risk, pharmocologic VTE prophylaxis indicated for most patients (in the absence of contraindications)

## 2025-05-09 NOTE — H&P PST ADULT - NSICDXFAMILYHX_GEN_ALL_CORE_FT
FAMILY HISTORY:  Father  Still living? Unknown  Family history of cancer of gallbladder, Age at diagnosis: Age Unknown    Aunt  Still living? Unknown  FH: diabetes mellitus, Age at diagnosis: Age Unknown

## 2025-05-30 ENCOUNTER — APPOINTMENT (OUTPATIENT)
Dept: FAMILY MEDICINE | Facility: CLINIC | Age: 65
End: 2025-05-30
Payer: COMMERCIAL

## 2025-05-30 VITALS
SYSTOLIC BLOOD PRESSURE: 130 MMHG | BODY MASS INDEX: 25.58 KG/M2 | OXYGEN SATURATION: 98 % | DIASTOLIC BLOOD PRESSURE: 70 MMHG | WEIGHT: 163 LBS | HEIGHT: 67 IN | RESPIRATION RATE: 12 BRPM | HEART RATE: 78 BPM

## 2025-05-30 DIAGNOSIS — R73.03 PREDIABETES.: ICD-10-CM

## 2025-05-30 DIAGNOSIS — Z01.818 ENCOUNTER FOR OTHER PREPROCEDURAL EXAMINATION: ICD-10-CM

## 2025-05-30 DIAGNOSIS — M17.11 UNILATERAL PRIMARY OSTEOARTHRITIS, RIGHT KNEE: ICD-10-CM

## 2025-05-30 DIAGNOSIS — R22.40 LOCALIZED SWELLING, MASS AND LUMP, UNSPECIFIED LOWER LIMB: ICD-10-CM

## 2025-05-30 PROBLEM — I10 ESSENTIAL (PRIMARY) HYPERTENSION: Chronic | Status: ACTIVE | Noted: 2025-05-09

## 2025-05-30 PROBLEM — D49.89 NEOPLASM OF UNSPECIFIED BEHAVIOR OF OTHER SPECIFIED SITES: Chronic | Status: ACTIVE | Noted: 2025-05-09

## 2025-05-30 PROCEDURE — 99213 OFFICE O/P EST LOW 20 MIN: CPT

## 2025-05-30 PROCEDURE — 83036 HEMOGLOBIN GLYCOSYLATED A1C: CPT | Mod: QW

## 2025-06-05 ENCOUNTER — OUTPATIENT (OUTPATIENT)
Dept: OUTPATIENT SERVICES | Facility: HOSPITAL | Age: 65
LOS: 1 days | End: 2025-06-05
Payer: COMMERCIAL

## 2025-06-05 ENCOUNTER — APPOINTMENT (OUTPATIENT)
Dept: ORTHOPEDIC SURGERY | Facility: HOSPITAL | Age: 65
End: 2025-06-05

## 2025-06-05 ENCOUNTER — TRANSCRIPTION ENCOUNTER (OUTPATIENT)
Age: 65
End: 2025-06-05

## 2025-06-05 PROCEDURE — 27447 TOTAL KNEE ARTHROPLASTY: CPT | Mod: AS,RT

## 2025-06-06 ENCOUNTER — TRANSCRIPTION ENCOUNTER (OUTPATIENT)
Age: 65
End: 2025-06-06

## 2025-06-06 PROCEDURE — 87070 CULTURE OTHR SPECIMN AEROBIC: CPT

## 2025-06-06 PROCEDURE — 27447 TOTAL KNEE ARTHROPLASTY: CPT | Mod: RT

## 2025-06-06 PROCEDURE — 73560 X-RAY EXAM OF KNEE 1 OR 2: CPT

## 2025-06-06 PROCEDURE — 97163 PT EVAL HIGH COMPLEX 45 MIN: CPT

## 2025-06-06 PROCEDURE — C1776: CPT

## 2025-06-06 PROCEDURE — 86850 RBC ANTIBODY SCREEN: CPT

## 2025-06-06 PROCEDURE — 36415 COLL VENOUS BLD VENIPUNCTURE: CPT

## 2025-06-06 PROCEDURE — S2900: CPT

## 2025-06-06 PROCEDURE — C1713: CPT

## 2025-06-06 PROCEDURE — 86900 BLOOD TYPING SEROLOGIC ABO: CPT

## 2025-06-06 PROCEDURE — 0055T BONE SRGRY CMPTR CT/MRI IMAG: CPT

## 2025-06-06 PROCEDURE — 86901 BLOOD TYPING SEROLOGIC RH(D): CPT

## 2025-06-06 PROCEDURE — 82962 GLUCOSE BLOOD TEST: CPT

## 2025-06-09 DIAGNOSIS — M17.11 UNILATERAL PRIMARY OSTEOARTHRITIS, RIGHT KNEE: ICD-10-CM

## 2025-06-17 RX ORDER — OXYCODONE 5 MG/1
5 TABLET ORAL EVERY 6 HOURS
Qty: 28 | Refills: 0 | Status: ACTIVE | COMMUNITY
Start: 2025-06-17 | End: 1900-01-01

## 2025-06-24 ENCOUNTER — APPOINTMENT (OUTPATIENT)
Dept: ORTHOPEDIC SURGERY | Facility: CLINIC | Age: 65
End: 2025-06-24
Payer: COMMERCIAL

## 2025-06-24 VITALS — BODY MASS INDEX: 25.11 KG/M2 | WEIGHT: 160 LBS | HEIGHT: 67 IN

## 2025-06-24 PROCEDURE — 73562 X-RAY EXAM OF KNEE 3: CPT | Mod: 26,RT

## 2025-06-24 PROCEDURE — 99024 POSTOP FOLLOW-UP VISIT: CPT

## 2025-07-24 ENCOUNTER — APPOINTMENT (OUTPATIENT)
Dept: ORTHOPEDIC SURGERY | Facility: CLINIC | Age: 65
End: 2025-07-24
Payer: COMMERCIAL

## 2025-07-24 DIAGNOSIS — Z96.651 PRESENCE OF RIGHT ARTIFICIAL KNEE JOINT: ICD-10-CM

## 2025-07-24 PROCEDURE — 73562 X-RAY EXAM OF KNEE 3: CPT | Mod: RT

## 2025-07-24 PROCEDURE — 99024 POSTOP FOLLOW-UP VISIT: CPT

## 2025-08-19 ENCOUNTER — NON-APPOINTMENT (OUTPATIENT)
Age: 65
End: 2025-08-19

## 2025-08-27 ENCOUNTER — APPOINTMENT (OUTPATIENT)
Dept: ORTHOPEDIC SURGERY | Facility: CLINIC | Age: 65
End: 2025-08-27

## 2025-09-02 ENCOUNTER — APPOINTMENT (OUTPATIENT)
Dept: FAMILY MEDICINE | Facility: CLINIC | Age: 65
End: 2025-09-02
Payer: COMMERCIAL

## 2025-09-02 VITALS
HEIGHT: 67 IN | WEIGHT: 163 LBS | HEART RATE: 63 BPM | DIASTOLIC BLOOD PRESSURE: 82 MMHG | BODY MASS INDEX: 25.58 KG/M2 | TEMPERATURE: 98 F | SYSTOLIC BLOOD PRESSURE: 128 MMHG | OXYGEN SATURATION: 98 %

## 2025-09-02 DIAGNOSIS — E78.5 HYPERLIPIDEMIA, UNSPECIFIED: ICD-10-CM

## 2025-09-02 DIAGNOSIS — I10 ESSENTIAL (PRIMARY) HYPERTENSION: ICD-10-CM

## 2025-09-02 DIAGNOSIS — R53.83 OTHER MALAISE: ICD-10-CM

## 2025-09-02 DIAGNOSIS — R53.81 OTHER MALAISE: ICD-10-CM

## 2025-09-02 DIAGNOSIS — R73.03 PREDIABETES.: ICD-10-CM

## 2025-09-02 DIAGNOSIS — Z96.651 PRESENCE OF RIGHT ARTIFICIAL KNEE JOINT: ICD-10-CM

## 2025-09-02 LAB — HBA1C MFR BLD HPLC: 6.2

## 2025-09-02 PROCEDURE — G2211 COMPLEX E/M VISIT ADD ON: CPT

## 2025-09-02 PROCEDURE — 36415 COLL VENOUS BLD VENIPUNCTURE: CPT

## 2025-09-02 PROCEDURE — 83036 HEMOGLOBIN GLYCOSYLATED A1C: CPT | Mod: QW

## 2025-09-02 PROCEDURE — 99214 OFFICE O/P EST MOD 30 MIN: CPT

## 2025-09-08 ENCOUNTER — NON-APPOINTMENT (OUTPATIENT)
Age: 65
End: 2025-09-08

## 2025-09-08 LAB
ALBUMIN SERPL ELPH-MCNC: 4.1 G/DL
ALP BLD-CCNC: 80 U/L
ALT SERPL-CCNC: 41 U/L
ANION GAP SERPL CALC-SCNC: 11 MMOL/L
AST SERPL-CCNC: 48 U/L
BILIRUB SERPL-MCNC: 0.5 MG/DL
BUN SERPL-MCNC: 16 MG/DL
CALCIUM SERPL-MCNC: 9.2 MG/DL
CHLORIDE SERPL-SCNC: 98 MMOL/L
CHOLEST SERPL-MCNC: 205 MG/DL
CO2 SERPL-SCNC: 24 MMOL/L
CREAT SERPL-MCNC: 0.83 MG/DL
EGFRCR SERPLBLD CKD-EPI 2021: 98 ML/MIN/1.73M2
GLUCOSE SERPL-MCNC: 126 MG/DL
HDLC SERPL-MCNC: 43 MG/DL
LDLC SERPL-MCNC: 136 MG/DL
NONHDLC SERPL-MCNC: 162 MG/DL
POTASSIUM SERPL-SCNC: 4.7 MMOL/L
PROT SERPL-MCNC: 7.5 G/DL
SODIUM SERPL-SCNC: 133 MMOL/L
TRIGL SERPL-MCNC: 143 MG/DL
VIT B12 SERPL-MCNC: 550 PG/ML

## 2025-09-08 RX ORDER — EZETIMIBE 10 MG/1
10 TABLET ORAL
Qty: 90 | Refills: 1 | Status: ACTIVE | COMMUNITY
Start: 2025-09-08 | End: 1900-01-01

## (undated) DEVICE — TAPE SILK 3"

## (undated) DEVICE — PREP CHLORAPREP HI-LITE ORANGE 26ML

## (undated) DEVICE — CONNECTOR 5 IN1 SUCTION TUBING

## (undated) DEVICE — GLV 8 PROTEXIS (CREAM) MICRO

## (undated) DEVICE — WARMING BLANKET LOWER ADULT

## (undated) DEVICE — DRAPE COVER SNAP 36X30"

## (undated) DEVICE — ELCTR BOVIE TIP NEEDLE INSULATED 2.8" EDGE

## (undated) DEVICE — SUT ETHILON 2-0 18" FS

## (undated) DEVICE — DRAPE 3/4 SHEET 52X76"

## (undated) DEVICE — DRSG STOCKINETTE IMPERVIOUS XL

## (undated) DEVICE — DRAPE IOBAN 23" X 23"

## (undated) DEVICE — SUT VICRYL 0 27" OS-6 UNDYED

## (undated) DEVICE — POSITIONER STRAP ARMBOARD VELCRO TS-30

## (undated) DEVICE — DRSG XEROFORM 5 X 9"

## (undated) DEVICE — DRSG COBAN 4"

## (undated) DEVICE — DRSG TELFA 3 X 8

## (undated) DEVICE — DRAPE U POLY BLUE 60"X60"

## (undated) DEVICE — SUT VICRYL 2-0 27" CP-1 UNDYED

## (undated) DEVICE — TOURNIQUET ESMARK 6"

## (undated) DEVICE — VENODYNE/SCD SLEEVE CALF MEDIUM

## (undated) DEVICE — PACK LIJ BASIC ORTHO

## (undated) DEVICE — TRAP SPECIMEN SPUTUM 40CC

## (undated) DEVICE — PROTECTOR HEEL / ELBOW FLUFFY

## (undated) DEVICE — DRSG COBAN COMPRESSION SYSTEM 2 LAYER